# Patient Record
Sex: FEMALE | Race: WHITE | NOT HISPANIC OR LATINO | Employment: FULL TIME | URBAN - METROPOLITAN AREA
[De-identification: names, ages, dates, MRNs, and addresses within clinical notes are randomized per-mention and may not be internally consistent; named-entity substitution may affect disease eponyms.]

---

## 2024-10-25 ENCOUNTER — OFFICE VISIT (OUTPATIENT)
Dept: INTERNAL MEDICINE | Facility: CLINIC | Age: 24
End: 2024-10-25
Payer: COMMERCIAL

## 2024-10-25 VITALS
WEIGHT: 173.6 LBS | DIASTOLIC BLOOD PRESSURE: 68 MMHG | OXYGEN SATURATION: 97 % | BODY MASS INDEX: 27.9 KG/M2 | HEART RATE: 68 BPM | TEMPERATURE: 98.7 F | RESPIRATION RATE: 16 BRPM | HEIGHT: 66 IN | SYSTOLIC BLOOD PRESSURE: 110 MMHG

## 2024-10-25 DIAGNOSIS — Z23 NEED FOR INFLUENZA VACCINATION: ICD-10-CM

## 2024-10-25 DIAGNOSIS — F41.9 ANXIETY AND DEPRESSION: Primary | ICD-10-CM

## 2024-10-25 DIAGNOSIS — F32.A ANXIETY AND DEPRESSION: Primary | ICD-10-CM

## 2024-10-25 RX ORDER — BUPROPION HYDROCHLORIDE 150 MG/1
150 TABLET ORAL DAILY
COMMUNITY
End: 2024-10-25

## 2024-10-25 RX ORDER — BUPROPION HYDROCHLORIDE 300 MG/1
300 TABLET ORAL DAILY
Qty: 30 TABLET | Refills: 1 | Status: SHIPPED | OUTPATIENT
Start: 2024-10-25

## 2024-10-25 NOTE — PROGRESS NOTES
Subjective   Evelyn Lawrence is a 24 y.o. female here to establish care.  Chief Complaint   Patient presents with    Establish Care    Anxiety     Currently taking Wellbutrin but does not like the way it makes her feel, has tried and failed Lexapro in the past     Depression       History of Present Illness  History of Present Illness  The patient is a 24-year-old female who is here to establish care. She has anxiety and depression, is currently on bupropion  mg daily. She is not satisfied with this medication.  She has significant worry about many different things.  She is family stressors.  She is experiencing difficulty focusing, fatigue, sleep disturbance, feeling down, having trouble relaxing.  She denies any HI/SI.  She has tried and failed Lexapro in the past.  Did not like how she felt with it.  She is interested in adjusting medications today.     She is due for flu vaccination like to update that today.  PHQ-9 Depression Screening  Little interest or pleasure in doing things? Several days   Feeling down, depressed, or hopeless? Over half   PHQ-2 Total Score 3   Trouble falling or staying asleep, or sleeping too much? Over half   Feeling tired or having little energy? Over half   Poor appetite or overeating? Almost all   Feeling bad about yourself - or that you are a failure or have let yourself or your family down? Almost all   Trouble concentrating on things, such as reading the newspaper or watching television?  Trouble relaxing, and is starting to affect her life.   Several days   Moving or speaking so slowly that other people could have noticed? Or the opposite - being so fidgety or restless that you have been moving around a lot more than usual? Not at all   Thoughts that you would be better off dead, or of hurting yourself in some way? Not at all   PHQ-9 Total Score 14   If you checked off any problems, how difficult have these problems made it for you to do your work, take care of things at  home, or get along with other people? Not difficult at all     Anxiety CAROLE-7    Feeling nervous, anxious or on edge: More than half the days  Not being able to stop or control worrying: Nearly every day  Worrying too much about different things: Nearly every day  Trouble Relaxing: Nearly every day  Being so restless that it is hard to sit still: Several days  Becoming easily annoyed or irritable: Not at all  Feeling afraid as if something awful might happen: Nearly every day  CAROLE 7 Total Score: 15  If you checked any problems, how difficult have these problems made it for you to do your work, take care of things at home, or get along with other people: Not difficult at all           The following portions of the patient's history were reviewed and updated as appropriate: allergies, current medications, past family history, past medical history, past social history, past surgical history and problem list.          No Known Allergies  Past Medical History:   Diagnosis Date    Anxiety     Depression February 2022    Ovarian cyst     I’ve had them in the past. Last in 2019    Scoliosis     Seizures     Temporal partial     Syncope     Urinary tract infection     I’ve had them in the past    Varicella     I was in elementary school     Past Surgical History:   Procedure Laterality Date    LAPAROSCOPIC SALPINGOOPHERECTOMY Left 08/28/2023    WISDOM TOOTH EXTRACTION       Family History   Problem Relation Age of Onset    Seizures Mother     Other (lupus) Mother     Lupus Mother     Heart attack Father 47    Hyperlipidemia Maternal Grandmother     COPD Paternal Grandfather     Diabetes Paternal Grandfather     Emphysema Paternal Grandfather     Breast cancer Neg Hx     Ovarian cancer Neg Hx     Uterine cancer Neg Hx     Colon cancer Neg Hx      Social History     Socioeconomic History    Marital status:    Tobacco Use    Smoking status: Never    Smokeless tobacco: Never   Vaping Use    Vaping status: Never Used  "  Substance and Sexual Activity    Alcohol use: Not Currently     Comment: I dont drink alot. Maybe once in a couple months    Drug use: No    Sexual activity: Yes     Partners: Male     Birth control/protection: None     Comment: We are trying to get pregant     Immunization History   Administered Date(s) Administered    Covid-19 (Pfizer) Gray Cap Monovalent 03/23/2022, 04/14/2022    Fluzone  >6mos 10/08/2011, 10/25/2024    HPV Quadrivalent 07/27/2009, 07/15/2011, 08/19/2013    Influenza TIV (IM) 09/28/2009, 10/06/2012    Meningococcal MCV4P (Menactra) 08/28/2009    Tdap 07/15/2011       Current Outpatient Medications:     buPROPion XL (Wellbutrin XL) 300 MG 24 hr tablet, Take 1 tablet by mouth Daily., Disp: 30 tablet, Rfl: 1    Objective   Blood pressure 110/68, pulse 68, temperature 98.7 °F (37.1 °C), temperature source Infrared, resp. rate 16, height 168 cm (66.14\"), weight 78.7 kg (173 lb 9.6 oz), SpO2 97%.  Physical Exam         Physical Exam  Vitals and nursing note reviewed.   Constitutional:       General: She is not in acute distress.     Appearance: Normal appearance. She is well-developed. She is not ill-appearing or diaphoretic.   HENT:      Head: Normocephalic and atraumatic.   Eyes:      General: No scleral icterus.     Conjunctiva/sclera: Conjunctivae normal.   Neck:      Thyroid: No thyroid mass or thyromegaly.      Vascular: No JVD.   Cardiovascular:      Rate and Rhythm: Normal rate and regular rhythm.      Chest Wall: PMI is not displaced. No thrill.      Heart sounds: Normal heart sounds. No murmur heard.  Pulmonary:      Effort: Pulmonary effort is normal. No accessory muscle usage or respiratory distress.      Breath sounds: Normal breath sounds.   Chest:      Chest wall: No tenderness.   Abdominal:      General: Bowel sounds are normal. There is no distension.      Palpations: Abdomen is soft.      Tenderness: There is no abdominal tenderness.   Musculoskeletal:         General: Normal range " of motion.      Cervical back: Normal range of motion.      Right lower leg: No edema.      Left lower leg: No edema.   Skin:     General: Skin is warm and dry.      Coloration: Skin is not ashen, jaundiced, mottled or pale.      Findings: No erythema.   Neurological:      General: No focal deficit present.      Mental Status: She is alert and oriented to person, place, and time.   Psychiatric:         Attention and Perception: Attention and perception normal. She is attentive.         Mood and Affect: Mood and affect normal. Mood is not anxious or depressed. Affect is not angry or inappropriate.         Speech: Speech normal.         Behavior: Behavior normal. Behavior is cooperative.         Thought Content: Thought content normal. Thought content is not paranoid or delusional. Thought content does not include homicidal or suicidal ideation. Thought content does not include homicidal or suicidal plan.         Cognition and Memory: Cognition and memory normal.         Judgment: Judgment normal.         Assessment & Plan   Diagnoses and all orders for this visit:    1. Anxiety and depression (Primary)  -     buPROPion XL (Wellbutrin XL) 300 MG 24 hr tablet; Take 1 tablet by mouth Daily.  Dispense: 30 tablet; Refill: 1  -     CBC (No Diff); Future  -     Comprehensive Metabolic Panel; Future  -     Lipid Panel; Future  -     TSH Rfx On Abnormal To Free T4; Future  -     Vitamin B12; Future  -     Vitamin D,25-Hydroxy; Future    2. Need for influenza vaccination  -     Fluzone >6mos ([id number])      Assessment & Plan  1. Anxiety and depression.  Bupropion will be increased to 2 mg daily.  We will follow-up in about 4 weeks to reevaluate.  Will also check labs as above.    2.  Health maintenance  Update flu vaccine today.           Return in about 4 weeks (around 11/22/2024) for Recheck, and needs to return for labs within 1-2 weeks.  Plan of care discussed with pt. They verbalized understanding and agreement.      Patient or patient representative verbalized consent for the use of Ambient Listening during the visit with  TONI Chand for chart documentation. 11/10/2024  13:56 EST

## 2024-10-28 ENCOUNTER — LAB (OUTPATIENT)
Dept: INTERNAL MEDICINE | Facility: CLINIC | Age: 24
End: 2024-10-28
Payer: COMMERCIAL

## 2024-10-28 DIAGNOSIS — F32.A ANXIETY AND DEPRESSION: ICD-10-CM

## 2024-10-28 DIAGNOSIS — F41.9 ANXIETY AND DEPRESSION: ICD-10-CM

## 2024-10-28 PROCEDURE — 80061 LIPID PANEL: CPT | Performed by: NURSE PRACTITIONER

## 2024-10-28 PROCEDURE — 80050 GENERAL HEALTH PANEL: CPT | Performed by: NURSE PRACTITIONER

## 2024-10-28 PROCEDURE — 36415 COLL VENOUS BLD VENIPUNCTURE: CPT | Performed by: NURSE PRACTITIONER

## 2024-10-28 PROCEDURE — 82306 VITAMIN D 25 HYDROXY: CPT | Performed by: NURSE PRACTITIONER

## 2024-10-28 PROCEDURE — 82607 VITAMIN B-12: CPT | Performed by: NURSE PRACTITIONER

## 2024-10-29 LAB
25(OH)D3 SERPL-MCNC: 19.7 NG/ML (ref 30–100)
ALBUMIN SERPL-MCNC: 4.2 G/DL (ref 3.5–5.2)
ALBUMIN/GLOB SERPL: 1.5 G/DL
ALP SERPL-CCNC: 60 U/L (ref 39–117)
ALT SERPL W P-5'-P-CCNC: 18 U/L (ref 1–33)
ANION GAP SERPL CALCULATED.3IONS-SCNC: 8 MMOL/L (ref 5–15)
AST SERPL-CCNC: 21 U/L (ref 1–32)
BILIRUB SERPL-MCNC: 0.5 MG/DL (ref 0–1.2)
BUN SERPL-MCNC: 8 MG/DL (ref 6–20)
BUN/CREAT SERPL: 11.8 (ref 7–25)
CALCIUM SPEC-SCNC: 9.2 MG/DL (ref 8.6–10.5)
CHLORIDE SERPL-SCNC: 105 MMOL/L (ref 98–107)
CHOLEST SERPL-MCNC: 134 MG/DL (ref 0–200)
CO2 SERPL-SCNC: 24 MMOL/L (ref 22–29)
CREAT SERPL-MCNC: 0.68 MG/DL (ref 0.57–1)
DEPRECATED RDW RBC AUTO: 39 FL (ref 37–54)
EGFRCR SERPLBLD CKD-EPI 2021: 124.1 ML/MIN/1.73
ERYTHROCYTE [DISTWIDTH] IN BLOOD BY AUTOMATED COUNT: 11.1 % (ref 12.3–15.4)
GLOBULIN UR ELPH-MCNC: 2.8 GM/DL
GLUCOSE SERPL-MCNC: 69 MG/DL (ref 65–99)
HCT VFR BLD AUTO: 40 % (ref 34–46.6)
HDLC SERPL-MCNC: 43 MG/DL (ref 40–60)
HGB BLD-MCNC: 13.5 G/DL (ref 12–15.9)
LDLC SERPL CALC-MCNC: 75 MG/DL (ref 0–100)
LDLC/HDLC SERPL: 1.72 {RATIO}
MCH RBC QN AUTO: 32.7 PG (ref 26.6–33)
MCHC RBC AUTO-ENTMCNC: 33.8 G/DL (ref 31.5–35.7)
MCV RBC AUTO: 96.9 FL (ref 79–97)
PLATELET # BLD AUTO: 268 10*3/MM3 (ref 140–450)
PMV BLD AUTO: 11.8 FL (ref 6–12)
POTASSIUM SERPL-SCNC: 4.1 MMOL/L (ref 3.5–5.2)
PROT SERPL-MCNC: 7 G/DL (ref 6–8.5)
RBC # BLD AUTO: 4.13 10*6/MM3 (ref 3.77–5.28)
SODIUM SERPL-SCNC: 137 MMOL/L (ref 136–145)
TRIGL SERPL-MCNC: 85 MG/DL (ref 0–150)
TSH SERPL DL<=0.05 MIU/L-ACNC: 1.18 UIU/ML (ref 0.27–4.2)
VIT B12 BLD-MCNC: 321 PG/ML (ref 211–946)
VLDLC SERPL-MCNC: 16 MG/DL (ref 5–40)
WBC NRBC COR # BLD AUTO: 7.27 10*3/MM3 (ref 3.4–10.8)

## 2024-11-01 ENCOUNTER — PATIENT ROUNDING (BHMG ONLY) (OUTPATIENT)
Dept: INTERNAL MEDICINE | Facility: CLINIC | Age: 24
End: 2024-11-01

## 2024-11-14 ENCOUNTER — OFFICE VISIT (OUTPATIENT)
Dept: OBSTETRICS AND GYNECOLOGY | Facility: CLINIC | Age: 24
End: 2024-11-14
Payer: COMMERCIAL

## 2024-11-14 VITALS — DIASTOLIC BLOOD PRESSURE: 74 MMHG | SYSTOLIC BLOOD PRESSURE: 112 MMHG

## 2024-11-14 DIAGNOSIS — Z31.9 INFERTILITY MANAGEMENT: Primary | ICD-10-CM

## 2024-11-14 RX ORDER — DOXYCYCLINE 100 MG/1
100 CAPSULE ORAL 2 TIMES DAILY
Qty: 28 CAPSULE | Refills: 0 | Status: SHIPPED | OUTPATIENT
Start: 2024-11-14 | End: 2024-11-28

## 2024-11-14 RX ORDER — LETROZOLE 2.5 MG/1
TABLET, FILM COATED ORAL
Qty: 5 TABLET | Refills: 0 | Status: SHIPPED | OUTPATIENT
Start: 2024-11-14

## 2024-11-14 NOTE — PROGRESS NOTES
Chief Complaint   Patient presents with    Follow-up    Infertility       Subjective   HPI  Evelyn Lawrence is a 24 y.o. female, , LMP was on Patient's last menstrual period was 2024 (exact date). who presents for preconceptual counseling.    Patient reports she has irregular periods. She started a period Monday but before that her last period was . She reports she had an ectopic pregnancy last year and after surgery she started to have regular periods but now are irregular again. Her periods are lasting  5-6  days.  Dysmenorrhea:moderate, occurring first 1-2 days of flow. The patient reports additional symptoms as  fatigue and insomnia .  She is currently trying to conceive. She has intercourse approximately 3 times per week. Her past medical history is noted for: anxiety/depression. and previous pelvic surgery. She has not had a previous workup for infertility. Partner Status: Marital Status: . Her partner has not fathered children. His past medical history is notable for no medical issues..    She is checking ovulation tests at home and she is ovulating monthly. He has had a semen analysis done and it was normal.     Current Outpatient Medications on File Prior to Visit   Medication Sig Dispense Refill    buPROPion XL (Wellbutrin XL) 300 MG 24 hr tablet Take 1 tablet by mouth Daily. 30 tablet 1     No current facility-administered medications on file prior to visit.        Additional OB/GYN History   Last Pap :   Last Completed Pap Smear            PAP SMEAR (Every 3 Years) Next due on 2022  LIQUID-BASED PAP SMEAR, [facility] ([facility])                  History of abnormal Pap smear: no  Exercises Regularly: yes  Feelings of Anxiety or Depression: yes. On medication  Tobacco Usage?: No   OB History          1    Para   0    Term   0       0    AB   1    Living   0         SAB   0    IAB   0    Ectopic   1    Molar   0    Multiple    0    Live Births   0                  Current Outpatient Medications:     buPROPion XL (Wellbutrin XL) 300 MG 24 hr tablet, Take 1 tablet by mouth Daily., Disp: 30 tablet, Rfl: 1    doxycycline (MONODOX) 100 MG capsule, Take 1 capsule by mouth 2 (Two) Times a Day for 14 days., Disp: 28 capsule, Rfl: 0    letrozole (Femara) 2.5 MG tablet, 1 po day 3 thru 7 of cycle, Disp: 5 tablet, Rfl: 0     Past Medical History:   Diagnosis Date    Anxiety     Depression February 2022    Ectopic pregnancy 08/28/2023    Ovarian cyst     I’ve had them in the past. Last in 2019    Scoliosis     Seizures     Temporal partial     Syncope     Urinary tract infection     I’ve had them in the past    Varicella     I was in elementary school        Past Surgical History:   Procedure Laterality Date    LAPAROSCOPIC SALPINGOOPHERECTOMY Left 08/28/2023    WISDOM TOOTH EXTRACTION         The additional following portions of the patient's history were reviewed and updated as appropriate: allergies, current medications, past family history, past medical history, past social history, and past surgical history.    Review of Systems  All other systems reviewed and are negative.     I have reviewed and agree with the HPI, ROS, and historical information as entered above. Sushant Negro MD    Labs were nl at [facility]   Objective   /74   LMP 11/11/2024 (Exact Date)     Physical Examnot done     Assessment & Plan     Assessment and Plan    Problem List Items Addressed This Visit    None  Visit Diagnoses       Infertility management    -  Primary    Relevant Medications    doxycycline (MONODOX) 100 MG capsule    letrozole (Femara) 2.5 MG tablet        Will try Femara starting today day 4 of cycle     Clomid prescribed.  Patient advised on risks and benefits as well as how to use.    No follow-ups on file.      Sushant Negro MD  11/14/2024

## 2024-11-26 ENCOUNTER — OFFICE VISIT (OUTPATIENT)
Dept: INTERNAL MEDICINE | Facility: CLINIC | Age: 24
End: 2024-11-26
Payer: COMMERCIAL

## 2024-11-26 VITALS
TEMPERATURE: 97.8 F | HEIGHT: 66 IN | DIASTOLIC BLOOD PRESSURE: 68 MMHG | HEART RATE: 72 BPM | OXYGEN SATURATION: 98 % | WEIGHT: 173 LBS | BODY MASS INDEX: 27.8 KG/M2 | SYSTOLIC BLOOD PRESSURE: 102 MMHG | RESPIRATION RATE: 16 BRPM

## 2024-11-26 DIAGNOSIS — F32.A ANXIETY AND DEPRESSION: Primary | ICD-10-CM

## 2024-11-26 DIAGNOSIS — F41.9 ANXIETY AND DEPRESSION: Primary | ICD-10-CM

## 2024-11-26 DIAGNOSIS — Z23 NEED FOR DIPHTHERIA-TETANUS-PERTUSSIS (TDAP) VACCINE: ICD-10-CM

## 2024-11-26 PROCEDURE — 90471 IMMUNIZATION ADMIN: CPT | Performed by: NURSE PRACTITIONER

## 2024-11-26 PROCEDURE — 90715 TDAP VACCINE 7 YRS/> IM: CPT | Performed by: NURSE PRACTITIONER

## 2024-11-26 PROCEDURE — 99214 OFFICE O/P EST MOD 30 MIN: CPT | Performed by: NURSE PRACTITIONER

## 2024-11-26 RX ORDER — BUPROPION HYDROCHLORIDE 300 MG/1
300 TABLET ORAL DAILY
Qty: 90 TABLET | Refills: 1 | Status: SHIPPED | OUTPATIENT
Start: 2024-11-26

## 2024-11-26 RX ORDER — SERTRALINE HYDROCHLORIDE 25 MG/1
25 TABLET, FILM COATED ORAL DAILY
Qty: 30 TABLET | Refills: 1 | Status: SHIPPED | OUTPATIENT
Start: 2024-11-26

## 2024-11-26 NOTE — LETTER
[facility]  Vaccine Consent Form    Patient Name:  Evelyn Lawrence  Patient :  2000     Vaccine(s) Ordered    Tdap Vaccine Greater Than or Equal To 8yo IM        Screening Checklist  The following questions should be completed prior to vaccination. If you answer “yes” to any question, it does not necessarily mean you should not be vaccinated. It just means we may need to clarify or ask more questions. If a question is unclear, please ask your healthcare provider to explain it.    Yes No   Any fever or moderate to severe illness today (mild illness and/or antibiotic treatment are not contraindications)?     Do you have a history of a serious reaction to any previous vaccinations, such as anaphylaxis, encephalopathy within 7 days, Guillain-Mamou syndrome within 6 weeks, seizure?     Have you received any live vaccine(s) (e.g MMR, MARK) or any other vaccines in the last month (to ensure duplicate doses aren't given)?     Do you have an anaphylactic allergy to latex (DTaP, DTaP-IPV, Hep A, Hep B, MenB, RV, Td, Tdap), baker’s yeast (Hep B, HPV), polysorbates (RSV, nirsevimab, PCV 20, Rotavirrus, Tdap, Shingrix), or gelatin (MARK, MMR)?     Do you have an anaphylactic allergy to neomycin (Rabies, MARK, MMR, IPV, Hep A), polymyxin B (IPV), or streptomycin (IPV)?      Any cancer, leukemia, AIDS, or other immune system disorder? (MARK, MMR, RV)     Do you have a parent, brother, or sister with an immune system problem (if immune competence of vaccine recipient clinically verified, can proceed)? (MMR, MARK)     Any recent steroid treatments for >2 weeks, chemotherapy, or radiation treatment? (MARK, MMR)     Have you received antibody-containing blood transfusions or IVIG in the past 11 months (recommended interval is dependent on product)? (MMR, MARK)     Have you taken antiviral drugs (acyclovir, famciclovir, valacyclovir for MARK) in the last 24 or 48 hours, respectively?      Are you pregnant or planning to become  "pregnant within 1 month? (MARK, MMR, HPV, IPV, MenB, Abrexvy; For Hep B- refer to Engerix-B; For RSV - Abrysvo is indicated for 32-36 weeks of pregnancy from September to January)     For infants, have you ever been told your child has had intussusception or a medical emergency involving obstruction of the intestine (Rotavirus)? If not for an infant, can skip this question.         *Ordering Physicians/APC should be consulted if \"yes\" is checked by the patient or guardian above.  I have received, read, and understand the Vaccine Information Statement (VIS) for each vaccine ordered.  I have considered my or my child's health status as well as the health status of my close contacts.  I have taken the opportunity to discuss my vaccine questions with my or my child's health care provider.   I have requested that the ordered vaccine(s) be given to me or my child.  I understand the benefits and risks of the vaccines.  I understand that I should remain in the clinic for 15 minutes after receiving the vaccine(s).  _________________________________________________________  Signature of Patient or Parent/Legal Guardian ____________________  Date     "

## 2024-11-27 NOTE — PROGRESS NOTES
Evelyn Lawrence presents to [facility] for     Chief Complaint  Chief Complaint   Patient presents with    Anxiety    Depression       PCP:  Zee Ervin APRN    Subjective        Pertinent negative symptoms include no abdominal pain, no anorexia, no joint pain, no change in stool, no chest pain, no chills, no congestion, no cough, no diaphoresis, no fatigue, no fever, no headaches, no joint swelling, no myalgias, no nausea, no neck pain, no numbness, no rash, no sore throat, no swollen glands, no dysuria, no vertigo, no visual change, no vomiting and no weakness.   Treatment and/or Medications comments include: I dont have any of these.   Additional information:  Its just a check up       Evelyn reports that the Wellbutrin has made some improvement but she does not note that increasing provide 150 to 300 mg has necessary done much for her anxiety or depression.  She tried Lexapro a long time ago and does not recall any issues with it.  However she recalls being on a lower dose and not being on it for very long.  Anxiety CAROLE-7    Feeling nervous, anxious or on edge: More than half the days  Not being able to stop or control worrying: Nearly every day  Worrying too much about different things: Nearly every day  Trouble Relaxing: More than half the days  Being so restless that it is hard to sit still: Not at all  Becoming easily annoyed or irritable: More than half the days  Feeling afraid as if something awful might happen: Nearly every day  CAROLE 7 Total Score: 15  If you checked any problems, how difficult have these problems made it for you to do your work, take care of things at home, or get along with other people: Somewhat difficult     PHQ-9 Depression Screening  Little interest or pleasure in doing things? Over half   Feeling down, depressed, or hopeless? Over half   PHQ-2 Total Score 4   Trouble falling or staying asleep, or sleeping too much? Several days   Feeling  tired or having little energy? Several days   Poor appetite or overeating? Almost all   Feeling bad about yourself - or that you are a failure or have let yourself or your family down? Almost all   Trouble concentrating on things, such as reading the newspaper or watching television? Not at all   Moving or speaking so slowly that other people could have noticed? Or the opposite - being so fidgety or restless that you have been moving around a lot more than usual? Not at all   Thoughts that you would be better off dead, or of hurting yourself in some way? Not at all   PHQ-9 Total Score 12   If you checked off any problems, how difficult have these problems made it for you to do your work, take care of things at home, or get along with other people? Somewhat difficult       Health Maintenance:   Health Maintenance   Topic Date Due    HEPATITIS C SCREENING  Never done    ANNUAL PHYSICAL  Never done    COVID-19 Vaccine (3 - 2024-25 season) 11/28/2024 (Originally 9/1/2024)    PAP SMEAR  09/16/2025    BMI FOLLOWUP  10/25/2025    TDAP/TD VACCINES (3 - Td or Tdap) 11/26/2034    INFLUENZA VACCINE  Completed    HPV VACCINES  Completed    Pneumococcal Vaccine 0-64  Aged Out             Review of Systems   Constitutional:  Negative for chills, diaphoresis, fatigue and fever.   HENT:  Negative for congestion, sore throat and swollen glands.    Respiratory:  Negative for cough.    Cardiovascular:  Negative for chest pain.   Gastrointestinal:  Negative for abdominal pain, anorexia, nausea and vomiting.   Genitourinary:  Negative for dysuria.   Musculoskeletal:  Negative for joint pain, myalgias and neck pain.   Skin:  Negative for rash.   Neurological:  Negative for vertigo, weakness and numbness.         No Known Allergies  No current outpatient medications on file prior to visit.     No current facility-administered medications on file prior to visit.         The following portions of the patient's history were reviewed and  "updated as appropriate: allergies, current medications, past family history, past medical history, past social history, past surgical history and problem list and are available for review within electronic record    Objective         Vital Signs:   /68 (BP Location: Left arm, Patient Position: Sitting, Cuff Size: Adult)   Pulse 72   Temp 97.8 °F (36.6 °C) (Infrared)   Resp 16   Ht 168 cm (66.14\")   Wt 78.5 kg (173 lb)   SpO2 98%   BMI 27.80 kg/m²         Physical Exam  Constitutional:       Appearance: Normal appearance. She is well-developed.   HENT:      Head: Normocephalic and atraumatic.   Eyes:      General: No scleral icterus.     Conjunctiva/sclera: Conjunctivae normal.   Cardiovascular:      Rate and Rhythm: Normal rate.   Pulmonary:      Effort: Pulmonary effort is normal. No respiratory distress.   Abdominal:      Palpations: Abdomen is soft.      Tenderness: There is no abdominal tenderness.   Musculoskeletal:         General: Normal range of motion.      Cervical back: Normal range of motion.      Right lower leg: No edema.      Left lower leg: No edema.   Skin:     General: Skin is warm and dry.   Neurological:      General: No focal deficit present.      Mental Status: She is alert and oriented to person, place, and time.   Psychiatric:         Attention and Perception: Attention normal.         Mood and Affect: Mood and affect normal. Mood is not anxious or depressed.         Behavior: Behavior normal. Behavior is cooperative.         Thought Content: Thought content normal. Thought content is not paranoid. Thought content does not include homicidal or suicidal ideation.         Cognition and Memory: Cognition normal.         Judgment: Judgment normal.                 Assessment and Plan      Diagnoses and all orders for this visit:    1. Anxiety and depression (Primary)  -     sertraline (Zoloft) 25 MG tablet; Take 1 tablet by mouth Daily.  Dispense: 30 tablet; Refill: 1  -     buPROPion " XL (Wellbutrin XL) 300 MG 24 hr tablet; Take 1 tablet by mouth Daily.  Dispense: 90 tablet; Refill: 1    2. Need for diphtheria-tetanus-pertussis (Tdap) vaccine  -     Tdap Vaccine Greater Than or Equal To 6yo IM    She is due for Tdap vaccination.  Will update that today.  Anxiety and depression are still uncontrolled albeit improving.  We will continue her bupropion at current dose and add sertraline 25 mg.  Adverse effects and expectations have been discussed.  She is interested in pregnancy in the future so this will be considered for medication dosing choices.  We may consider decreasing the bupropion soon since she did not notice much difference between 150 to 300 mg doses.  She will follow-up in about 4 to 6 weeks to reevaluate and keep me informed of any concerns in the interim.    Follow Up     Patient was given instructions and counseling regarding her condition or for health maintenance advice. Please see specific information pulled into the AVS if appropriate.   Any new medication's adverse effects have been discussed in detail with patient  Patient was encouraged to keep me informed of any acute changes, lack of improvement, or any new concerning symptoms.    Return in about 6 weeks (around 1/7/2025).          Dictated Utilizing Dragon Dictation   Please note that portions of this note were completed with a voice recognition program.   Part of this note may be an electronic transcription/translation of spoken language to printed text using the Dragon Dictation System.

## 2024-12-16 ENCOUNTER — TELEPHONE (OUTPATIENT)
Dept: OBSTETRICS AND GYNECOLOGY | Facility: CLINIC | Age: 24
End: 2024-12-16
Payer: COMMERCIAL

## 2024-12-16 DIAGNOSIS — Z31.9 INFERTILITY MANAGEMENT: ICD-10-CM

## 2024-12-16 DIAGNOSIS — Z31.9 INFERTILITY MANAGEMENT: Primary | ICD-10-CM

## 2024-12-16 RX ORDER — LETROZOLE 2.5 MG/1
5 TABLET, FILM COATED ORAL DAILY
Qty: 10 TABLET | Refills: 0 | Status: SHIPPED | OUTPATIENT
Start: 2024-12-16 | End: 2024-12-16 | Stop reason: SDUPTHER

## 2024-12-16 RX ORDER — LETROZOLE 2.5 MG/1
5 TABLET, FILM COATED ORAL DAILY
Qty: 10 TABLET | Refills: 0 | Status: SHIPPED | OUTPATIENT
Start: 2024-12-16 | End: 2024-12-21

## 2024-12-16 NOTE — TELEPHONE ENCOUNTER
Caller: Melinda Evelyn Tianna    Relationship: Self    Best call back number:    [phone]    Requested Prescriptions:     LETROZOL (INCREASE DOSAGE PER LAST OFFICE VISIT)     Pharmacy where request should [facility] sent[facility] [id number] [place] [facility] [address] [address] & [facility] - [phone] [facility] [phone]      Last office visit with prescribing clinician: 11/14/2024   Last telemedicine visit with prescribing clinician: Visit date not found   Next office visit with prescribing clinician: Visit date not found     Additional details provided by patient:     PT IS NOT PREGNANT AND NEEDS SCRIPT SENT IN        Does the patient have less than a 3 day supply:  [x] Yes  [] No    Would you like a call back once the refill request has been completed: [x] Yes [] No    If the office needs to give you a call back, can they leave a voicemail: [x] Yes [] No    Anton Contreras Rep   12/16/24 12:05 EST

## 2024-12-16 NOTE — TELEPHONE ENCOUNTER
Caller: Evelyn Lawrence    Relationship: Self    Best call back number: [phone]    What was the call regarding: PT REQUESTING TO HAVE THE letrozole (Femara) 2.5 MG tablet PRESCRIPTION SENT TO:   [facility]   Address: [facility] [place] [address]  Phone: [phone]     STATED THIS PHARMACY IS MU[place] CHEAPER THAN [place]

## 2025-01-09 ENCOUNTER — TELEPHONE (OUTPATIENT)
Dept: OBSTETRICS AND GYNECOLOGY | Facility: CLINIC | Age: 25
End: 2025-01-09
Payer: COMMERCIAL

## 2025-01-09 NOTE — TELEPHONE ENCOUNTER
Patient of Dr. Negro; LOV 11/14/24. She was given Rx for Femara 2.5 mg.  She was given another Rx for Femara 5 mg daily on 12/16/24.  Returned patient's call.   States she took the Femara in November. She did not take the Femara in December because her period was much lighter than usual and only lasted 2 days; it started 12/16/24.   States she had negative UPT December 30, 31st, January 1, and 8.   Advised that she can use Femara with her next cycle. She should call if she does not start her next period and still has a negative pregnancy test.   She v/u and agreed.

## 2025-01-09 NOTE — TELEPHONE ENCOUNTER
Provider: DR. BARBOSA    Caller: Evelyn Lawrence    Relationship to Patient: Self    Pharmacy:     Phone Number: [phone]    Reason for Call: MEDICAL QUESTION     When was the patient last seen: 11.14.24    PATIENT IS CALLING IN TO LET DR. BARBOSA KNOW THAT SHE HAS STOPPED TALKING THE MEDICATION  letrozole (Femara) 2.5 MG tablet  BECAUSE HER CYCLE HAS BEEN IRREGULAR. PATIENT HAS NOT TAKING MEDICATION SINCE 11.13.2024 BECAUSE SHE ONLY HAD A CYCLE FOR 2 DAYS IN THE LAST MONTH THAT WAS VERY LIGHT.    PATIENT CAN BE REACHED .[phone]    THANK YOU

## 2025-01-13 NOTE — TELEPHONE ENCOUNTER
Caller: Evelyn Lawrence     Relationship: SELF    Best call back number: [phone]      What is your medical concern? PT CALLED TO INFORM PROVIDER THAT SHE STARTED HER CYCLE ON THE 10TH AND IS TAKING THE  letrozole (Femara) 2.5 MG tablet  AGAIN SINCE SHE STARTED HER CYCLE    Is your provider already aware of this issue? YES

## 2025-01-14 ENCOUNTER — OFFICE VISIT (OUTPATIENT)
Dept: INTERNAL MEDICINE | Facility: CLINIC | Age: 25
End: 2025-01-14
Payer: COMMERCIAL

## 2025-01-14 VITALS
HEIGHT: 66 IN | BODY MASS INDEX: 27.87 KG/M2 | TEMPERATURE: 97.8 F | WEIGHT: 173.4 LBS | SYSTOLIC BLOOD PRESSURE: 100 MMHG | RESPIRATION RATE: 14 BRPM | DIASTOLIC BLOOD PRESSURE: 64 MMHG | HEART RATE: 68 BPM | OXYGEN SATURATION: 96 %

## 2025-01-14 DIAGNOSIS — F41.9 ANXIETY AND DEPRESSION: ICD-10-CM

## 2025-01-14 DIAGNOSIS — F32.A ANXIETY AND DEPRESSION: ICD-10-CM

## 2025-01-14 PROCEDURE — 99213 OFFICE O/P EST LOW 20 MIN: CPT | Performed by: NURSE PRACTITIONER

## 2025-01-14 NOTE — PROGRESS NOTES
Subjective   Evelyn Lawrence is a 24 y.o. female.     Chief Complaint   Patient presents with    Anxiety    Depression     6 week f/u        PCP: Zee Ervin APRN    History of Present Illness     History of Present Illness    Evelyn is a 24-year-old female who is here today to follow-up on anxiety and depression.  She reports that she feels as though she is doing overall fairly well.  She is currently on sertraline.  Tolerating well without any side effects.  She is also on bupropion 300 mg daily.  She does note that both medications have significantly helped.  Although, she feels as though she could have a little bit better control of anxiety.  No HI/SI.  Anxiety CAROLE-7    Feeling nervous, anxious or on edge: More than half the days  Not being able to stop or control worrying: Nearly every day  Worrying too much about different things: Nearly every day  Trouble Relaxing: Not at all  Being so restless that it is hard to sit still: Not at all  Becoming easily annoyed or irritable: Several days  Feeling afraid as if something awful might happen: More than half the days  CAROLE 7 Total Score: 11  If you checked any problems, how difficult have these problems made it for you to do your work, take care of things at home, or get along with other people: Not difficult at all     PHQ-9 Depression Screening  Little interest or pleasure in doing things? Several days   Feeling down, depressed, or hopeless? Several days   PHQ-2 Total Score 2   Trouble falling or staying asleep, or sleeping too much? Not at all   Feeling tired or having little energy? Several days   Poor appetite or overeating? Over half   Feeling bad about yourself - or that you are a failure or have let yourself or your family down? Over half   Trouble concentrating on things, such as reading the newspaper or watching television? Not at all   Moving or speaking so slowly that other people could have noticed? Or the opposite - being so fidgety or restless  "that you have been moving around a lot more than usual? Not at all   Thoughts that you would be better off dead, or of hurting yourself in some way? Not at all   PHQ-9 Total Score 7   If you checked off any problems, how difficult have these problems made it for you to do your work, take care of things at home, or get along with other people? Not difficult at all       Results      Lab Results   Component Value Date    WBC 7.27 10/28/2024    HGB 13.5 10/28/2024    HCT 40.0 10/28/2024    MCV 96.9 10/28/2024     10/28/2024     Lab Results   Component Value Date    GLUCOSE 69 10/28/2024    BUN 8 10/28/2024    CREATININE 0.68 10/28/2024    EGFR 124.1 10/28/2024    BCR 11.8 10/28/2024    K 4.1 10/28/2024    CO2 24.0 10/28/2024    CALCIUM 9.2 10/28/2024    ALBUMIN 4.2 10/28/2024    BILITOT 0.5 10/28/2024    AST 21 10/28/2024    ALT 18 10/28/2024     Lab Results   Component Value Date    CHOL 134 10/28/2024    TRIG 85 10/28/2024    HDL 43 10/28/2024    LDL 75 10/28/2024     Lab Results   Component Value Date    TSH 1.180 10/28/2024         The following portions of the patient's history were reviewed and updated as appropriate: allergies, current medications, past family history, past medical history, past social history, past surgical history and problem list.              Outpatient Medications Marked as Taking for the 1/14/25 encounter (Office Visit) with Zee Ervin APRN   Medication Sig Dispense Refill    sertraline (Zoloft) 50 MG tablet Take 1 tablet by mouth Daily. 90 tablet 1     No Known Allergies        Objective     Vitals:    01/14/25 1052   BP: 100/64   BP Location: Left arm   Patient Position: Sitting   Cuff Size: Adult   Pulse: 68   Resp: 14   Temp: 97.8 °F (36.6 °C)   TempSrc: Infrared   SpO2: 96%   Weight: 78.7 kg (173 lb 6.4 oz)   Height: 168 cm (66.14\")     Body mass index is 27.87 kg/m².  Wt Readings from Last 3 Encounters:   01/14/25 78.7 kg (173 lb 6.4 oz)   11/26/24 78.5 kg (173 lb) "   10/25/24 78.7 kg (173 lb 9.6 oz)       Physical Exam  Constitutional:       Appearance: Normal appearance. She is well-developed.   HENT:      Head: Normocephalic and atraumatic.   Eyes:      General: No scleral icterus.     Conjunctiva/sclera: Conjunctivae normal.   Cardiovascular:      Rate and Rhythm: Normal rate and regular rhythm.      Heart sounds: Normal heart sounds.   Pulmonary:      Effort: Pulmonary effort is normal. No respiratory distress.      Breath sounds: Normal breath sounds.   Abdominal:      General: Bowel sounds are normal.      Palpations: Abdomen is soft.      Tenderness: There is no abdominal tenderness.   Musculoskeletal:         General: Normal range of motion.      Cervical back: Normal range of motion.      Right lower leg: No edema.      Left lower leg: No edema.   Skin:     General: Skin is warm and dry.   Neurological:      General: No focal deficit present.      Mental Status: She is alert and oriented to person, place, and time.   Psychiatric:         Attention and Perception: Attention normal.         Mood and Affect: Mood and affect normal.         Behavior: Behavior normal. Behavior is cooperative.         Thought Content: Thought content normal.         Cognition and Memory: Cognition and memory normal.         Judgment: Judgment normal.                 Assessment & Plan   Diagnoses and all orders for this visit:    1. Anxiety and depression  -     sertraline (Zoloft) 50 MG tablet; Take 1 tablet by mouth Daily.  Dispense: 90 tablet; Refill: 1      She has had improvement in symptoms with the sertraline and the bupropion.  She would like to try to augment medication therapy.  We will go ahead and increase her sertraline to 50 mg while continuing her bupropion at 300 mg.  She will follow-up in about 6-8weeks to reevaluate.  She will keep me informed of any problems or concerns      Return in about 2 months (around 3/14/2025) for Recheck.    I discussed my  findings,recommendations, and plan of care was with the patient. They verbalized understanding and agreement.  Patient was encouraged to keep me informed of any acute changes, lack of improvement, or any new concerning symptoms.

## 2025-01-16 ENCOUNTER — TELEPHONE (OUTPATIENT)
Dept: OBSTETRICS AND GYNECOLOGY | Facility: CLINIC | Age: 25
End: 2025-01-16
Payer: COMMERCIAL

## 2025-01-16 NOTE — TELEPHONE ENCOUNTER
Patient of Dr. Negro; LOV 11/14/24.  She is trying to conceive and has been given Femara.  Attempted to return patient's call. Left voice message to call us back.

## 2025-01-16 NOTE — TELEPHONE ENCOUNTER
Patient returned my call.  States she read on [employer] that she might need to have a Cycle Day 21 Progesterone level while using Femara to try to conceive.  Discussed that Dr. Negro's last note states she was using OPKs that indicate she is ovulating. He did not indicate a need for labs.  She v/u.

## 2025-01-16 NOTE — TELEPHONE ENCOUNTER
Caller: Evelyn Lawrence     Relationship: SELF     Best call back number: [phone]    What is your medical concern? PT IS TAKING MEDICATION FOR OVULATION, SHE WANTS TO KNOW IF SHE NEEDS TO COME IN ON A MONTHLY BASIS AFTER HER 21ST DAY IN CYCLE TO HAVE HER LEVELS CHECKED OR ???    How long has this issue been going on? ONGOING    Is your provider already aware of this issue? YES    Have you been treated for this issue? JUST WANT TO FOLLOW UP TO MAKE SURE SHE IS DOING THIS CORRECTLY    PLEASE CALL TO ADVISE

## 2025-02-06 ENCOUNTER — TELEPHONE (OUTPATIENT)
Dept: OBSTETRICS AND GYNECOLOGY | Facility: CLINIC | Age: 25
End: 2025-02-06
Payer: COMMERCIAL

## 2025-02-06 DIAGNOSIS — Z31.9 INFERTILITY MANAGEMENT: Primary | ICD-10-CM

## 2025-02-06 NOTE — TELEPHONE ENCOUNTER
"  Caller: Evelyn Lawrence \"Evelyn\"  Female, 24 y.o., 2000  MRN: [id number]  CSN: [id number]  Phone: [phone]    Relationship: SELF        Requested Prescriptions: letrozole (Femara) 2.5 MG tablet   Requested Prescriptions      No prescriptions requested or ordered in this encounter        Pharmacy where request should be sent:    [address] - [address] [place] [address] [phone] [facility] [phone]    [address] [place] [id number]   Phone: [phone] Fax: [phone]   Hours: Not open 24 hours       Last office visit with prescribing clinician: 11/14/2024   Last telemedicine visit with prescribing clinician: Visit date not found   Next office visit with prescribing clinician: Visit date not found     Additional details provided by patient: PT STATES SHE BEGAN HER CYCLE THIS MORNING, PT STATES SHE DID OVULATE THIS MONTH AS WELL      "

## 2025-02-06 NOTE — TELEPHONE ENCOUNTER
Patient of Dr. Negro; LOV 11/14/24 for infertility management.   11/14/24  Rx for Femara 2.5 mg daily  12/16/24  Rx for Femara 5 mg daily  Returned patient's call.   She started her period this morning and is requesting another RX for Femara.   Informed her that Dr. Negro is out of the office this afternoon. I have spoken with one of his nurses, Nia. She will check with him tomorrow and call patient back.  Patient v/u and agreed.

## 2025-02-07 RX ORDER — LETROZOLE 2.5 MG/1
TABLET, FILM COATED ORAL
Qty: 15 TABLET | Refills: 0 | Status: SHIPPED | OUTPATIENT
Start: 2025-02-07

## 2025-02-07 RX ORDER — LETROZOLE 2.5 MG/1
TABLET, FILM COATED ORAL
Qty: 15 TABLET | Refills: 0 | Status: SHIPPED | OUTPATIENT
Start: 2025-02-07 | End: 2025-02-07

## 2025-02-07 NOTE — TELEPHONE ENCOUNTER
PT WENT TO  PRESCRIPTION AND IT IS OVER A HUNDRED DOLLARS - PT WOULD LIKE TO KNOW IF WE CAN SEND IT BACK TO [place] IN [place] - PLEASE CALL

## 2025-03-04 ENCOUNTER — TELEPHONE (OUTPATIENT)
Dept: OBSTETRICS AND GYNECOLOGY | Facility: CLINIC | Age: 25
End: 2025-03-04
Payer: COMMERCIAL

## 2025-03-04 DIAGNOSIS — O09.10 PREGNANCY WITH HISTORY OF ECTOPIC PREGNANCY, ANTEPARTUM: Primary | ICD-10-CM

## 2025-03-04 NOTE — TELEPHONE ENCOUNTER
PT STATES SHE JUST FOUND OUT SHE WAS PREGNANT, LMP WAS 2/9, SHE STATES HER LAST PREGNANCY WAS AN ECTOPIC PREGNANCY SO SHE WANTED TO SPEAK TO SOMEONE AND SEE WHAT SHE NEEDED TO DO    PLEASE ADVISE

## 2025-03-04 NOTE — TELEPHONE ENCOUNTER
Patient of Dr. Negro; LOV 11/14/24.  Returned patient's call.   LMP 02/09/25 = 3w 2d; used Femara to conceive  +UPT x 7 today  States she tested positive for Flu A 2 days ago. She was having some nausea and was given Rx for Zofran. Advised not to use Zofran or any Ibuprofen products.   States she is feeling better today.   Denies any bleeding, pain, or problems.  Discussed that she can try plain Mucinex, plain Robitussin, cough drops, Zyrtec or Claritin, or Flonase nasal spray. Avoid decongestants.    She has hx ectopic pregnancy; asking if she needs to do anything.   Discussed that we can wait until she recovers from the flu and has missed her period. She can come in next Monday (3/10/25) for HCG and Progesterone levels. May need to repeat after 48-72 hours.   Patient v/u and agreed. She will call sooner with any problems.

## 2025-03-04 NOTE — TELEPHONE ENCOUNTER
Caller: Evelyn Lawrence    Relationship to patient: Self    Best call back number: [phone] (home)       Patient FORGOT TO MENTION THAT SHE ALSO TESTED POSITIVE FOR FLU A OVER THE WEEKEND TOO.

## 2025-03-07 ENCOUNTER — LAB (OUTPATIENT)
Dept: INTERNAL MEDICINE | Facility: CLINIC | Age: 25
End: 2025-03-07
Payer: COMMERCIAL

## 2025-03-07 ENCOUNTER — TELEPHONE (OUTPATIENT)
Dept: OBSTETRICS AND GYNECOLOGY | Facility: CLINIC | Age: 25
End: 2025-03-07
Payer: COMMERCIAL

## 2025-03-07 PROCEDURE — 84144 ASSAY OF PROGESTERONE: CPT | Performed by: OBSTETRICS & GYNECOLOGY

## 2025-03-07 PROCEDURE — 84702 CHORIONIC GONADOTROPIN TEST: CPT | Performed by: OBSTETRICS & GYNECOLOGY

## 2025-03-07 NOTE — TELEPHONE ENCOUNTER
Patient states she is early pregnant about 3 weeks and is having some tingling in her previous laparoscopic ectopic surgery 2023. She states it is the R side. She also reports mild cramping on the same side. She denies any redness, warmth to touch, or pain at the scar site. I do see that an HCG and progesterone was ordered but she has not had that completed yet. I let her know I will speak with OP and call her back with recommendations. She VU

## 2025-03-07 NOTE — TELEPHONE ENCOUNTER
Provider: DR BARBOSA     Caller: Evelyn Lawrence    Relationship to Patient: Self        Reason for Call: PT HAS HAD A WEIRD FEELING IN HER ECTOPIC SURGERY SCAR (ITS HARD FOR PT TO EXPLAIN) KIND OF TINGLY NOT PAINFUL AND WOULD LIKE TO BE ADVISED AS IF ITS NORMAL  PLEASE CALL

## 2025-03-07 NOTE — TELEPHONE ENCOUNTER
Spoke with OP and he states he is not concerned with another ectopic at this point for patient to have hcg and progesterone completed for baseline. He said she should not have any pain or symptoms at this point if it was ectopic. She is aware of signs to watch for and will schedule NOB with US for after 6 weeks once labs come back.

## 2025-03-08 LAB
HCG INTACT+B SERPL-ACNC: 212 MIU/ML
PROGEST SERPL-MCNC: 18.5 NG/ML

## 2025-03-10 ENCOUNTER — TELEPHONE (OUTPATIENT)
Dept: OBSTETRICS AND GYNECOLOGY | Facility: CLINIC | Age: 25
End: 2025-03-10
Payer: COMMERCIAL

## 2025-03-10 DIAGNOSIS — O09.10 PREGNANCY WITH HISTORY OF ECTOPIC PREGNANCY, ANTEPARTUM: Primary | ICD-10-CM

## 2025-03-11 ENCOUNTER — TELEPHONE (OUTPATIENT)
Dept: OBSTETRICS AND GYNECOLOGY | Facility: CLINIC | Age: 25
End: 2025-03-11
Payer: COMMERCIAL

## 2025-03-11 ENCOUNTER — LAB (OUTPATIENT)
Dept: INTERNAL MEDICINE | Facility: CLINIC | Age: 25
End: 2025-03-11
Payer: COMMERCIAL

## 2025-03-11 DIAGNOSIS — Z34.00 INITIAL OBSTETRIC VISIT, ANTEPARTUM: Primary | ICD-10-CM

## 2025-03-11 DIAGNOSIS — O09.10 PREGNANCY WITH HISTORY OF ECTOPIC PREGNANCY, ANTEPARTUM: Primary | ICD-10-CM

## 2025-03-11 PROCEDURE — 84702 CHORIONIC GONADOTROPIN TEST: CPT | Performed by: OBSTETRICS & GYNECOLOGY

## 2025-03-11 NOTE — TELEPHONE ENCOUNTER
LMP 2/9/2025. Approx GA 4w2d.     Intermittent mid pelvic cramping and right pelvic cramping, rated 1/10. Hx left ectopic pregnancy 2023. S/P left salpingectomy. Denies bleeding.    3/7/2025 , Progesterone 18.5. Patient reports beta HCG performed today at work. Will review and notify patient when resulted. Tylenol sparingly PRN mild pain. Call for US if pain is worsening or bleeding starts. ED for severe pain. Ectopic precautions reviewed.

## 2025-03-12 ENCOUNTER — TELEPHONE (OUTPATIENT)
Dept: OBSTETRICS AND GYNECOLOGY | Facility: CLINIC | Age: 25
End: 2025-03-12
Payer: COMMERCIAL

## 2025-03-12 NOTE — TELEPHONE ENCOUNTER
Recent hcg results reviewed. There was an appropriate rise from previous result. Pt has no complaints. Has a NOB appt scheduled for 3/27/25. Will call sooner for pain or bleeding.

## 2025-03-12 NOTE — TELEPHONE ENCOUNTER
Caller: Evelyn Lawrence    Relationship: Self    Best call back number: [phone]    Who are you requesting to speak with (clinical staff, DR. BARBOSA    What was the call regarding: PATIENT CALLED ABOUT LAB RESULTS PLEASE ASSIST.     .”

## 2025-03-13 ENCOUNTER — LAB (OUTPATIENT)
Dept: INTERNAL MEDICINE | Facility: CLINIC | Age: 25
End: 2025-03-13
Payer: COMMERCIAL

## 2025-03-13 DIAGNOSIS — O09.10 PREGNANCY WITH HISTORY OF ECTOPIC PREGNANCY, ANTEPARTUM: Primary | ICD-10-CM

## 2025-03-13 DIAGNOSIS — O09.10 PREGNANCY WITH HISTORY OF ECTOPIC PREGNANCY, ANTEPARTUM: ICD-10-CM

## 2025-03-13 PROCEDURE — 84702 CHORIONIC GONADOTROPIN TEST: CPT | Performed by: OBSTETRICS & GYNECOLOGY

## 2025-03-13 NOTE — PROGRESS NOTES
Repeat Hcg ordered per pt request. Advised her to continue to monitor for bleeding and unilateral pain due to history of ectopic pregnancy.

## 2025-03-14 ENCOUNTER — OFFICE VISIT (OUTPATIENT)
Dept: INTERNAL MEDICINE | Facility: CLINIC | Age: 25
End: 2025-03-14
Payer: COMMERCIAL

## 2025-03-14 ENCOUNTER — TELEPHONE (OUTPATIENT)
Dept: OBSTETRICS AND GYNECOLOGY | Facility: CLINIC | Age: 25
End: 2025-03-14
Payer: COMMERCIAL

## 2025-03-14 VITALS
SYSTOLIC BLOOD PRESSURE: 102 MMHG | DIASTOLIC BLOOD PRESSURE: 66 MMHG | HEIGHT: 66 IN | TEMPERATURE: 97.5 F | RESPIRATION RATE: 14 BRPM | BODY MASS INDEX: 27.61 KG/M2 | WEIGHT: 171.8 LBS | OXYGEN SATURATION: 96 % | HEART RATE: 76 BPM

## 2025-03-14 DIAGNOSIS — F41.9 ANXIETY AND DEPRESSION: Primary | ICD-10-CM

## 2025-03-14 DIAGNOSIS — F32.A ANXIETY AND DEPRESSION: Primary | ICD-10-CM

## 2025-03-14 DIAGNOSIS — O09.10 PREGNANCY WITH HISTORY OF ECTOPIC PREGNANCY, ANTEPARTUM: Primary | ICD-10-CM

## 2025-03-14 DIAGNOSIS — O09.10 PREGNANCY WITH HISTORY OF ECTOPIC PREGNANCY, ANTEPARTUM: ICD-10-CM

## 2025-03-14 LAB — HCG INTACT+B SERPL-ACNC: 2036 MIU/ML

## 2025-03-14 PROCEDURE — 99214 OFFICE O/P EST MOD 30 MIN: CPT | Performed by: NURSE PRACTITIONER

## 2025-03-14 NOTE — PROGRESS NOTES
Evelyn Lawrence presents to [facility] for     Chief Complaint  Chief Complaint   Patient presents with    Anxiety    Depression       PCP:  Zee Ervin APRN Subjective          Evelyn is a 24-year-old female who is here to follow-up on anxiety depression.  She feels as though symptoms are very well-managed.  She just found out that she is pregnant and she is very excited about that.  However, she is at nervous because she does have a history of tubal pregnancy.  She is followed OB/GYN.  She has an appointment within 1 week.  No abdominal pain, cramping, bleeding, spotting.      Pertinent negative symptoms include no abdominal pain, no anorexia, no joint pain, no change in stool, no chest pain, no chills, no congestion, no cough, no diaphoresis, no fatigue, no fever, no headaches, no joint swelling, no myalgias, no nausea, no neck pain, no numbness, no rash, no sore throat, no swollen glands, no dysuria, no vertigo, no visual change, no vomiting and no weakness.     PHQ-9 Depression Screening  Little interest or pleasure in doing things? Not at all   Feeling down, depressed, or hopeless? Not at all   PHQ-2 Total Score 0   Trouble falling or staying asleep, or sleeping too much? Several days   Feeling tired or having little energy? Several days   Poor appetite or overeating? Not at all   Feeling bad about yourself - or that you are a failure or have let yourself or your family down? Not at all   Trouble concentrating on things, such as reading the newspaper or watching television? Not at all   Moving or speaking so slowly that other people could have noticed? Or the opposite - being so fidgety or restless that you have been moving around a lot more than usual? Not at all     Thoughts that you would be better off dead, or of hurting yourself in some way? Not at all   PHQ-9 Total Score 2   If you checked off any problems, how difficult have these problems made it for you to  do your work, take care of things at home, or get along with other people? Not difficult at all       Anxiety CAROLE-7    Feeling nervous, anxious or on edge: Several days  Not being able to stop or control worrying: Several days  Worrying too much about different things: Several days  Trouble Relaxing: Not at all  Being so restless that it is hard to sit still: Not at all  Becoming easily annoyed or irritable: Not at all  Feeling afraid as if something awful might happen: Several days  CAROLE 7 Total Score: 4  If you checked any problems, how difficult have these problems made it for you to do your work, take care of things at home, or get along with other people: Not difficult at all     lmp    Health Maintenance:   Health Maintenance   Topic Date Due    HEPATITIS C SCREENING  Never done    ANNUAL PHYSICAL  Never done    COVID-19 Vaccine (3 - 2024-25 season) 09/01/2024    PAP SMEAR  09/16/2025    BMI FOLLOWUP  10/25/2025    TDAP/TD VACCINES (3 - Td or Tdap) 11/26/2034    INFLUENZA VACCINE  Completed    HPV VACCINES  Completed    Pneumococcal Vaccine 0-49  Aged Out         Review of Systems   Constitutional:  Negative for chills, diaphoresis, fatigue and fever.   HENT:  Negative for congestion, sore throat and swollen glands.    Respiratory:  Negative for cough.    Cardiovascular:  Negative for chest pain.   Gastrointestinal:  Negative for abdominal pain, anorexia, nausea and vomiting.   Genitourinary:  Negative for dysuria.   Musculoskeletal:  Negative for joint pain, myalgias and neck pain.   Skin:  Negative for rash.   Neurological:  Negative for vertigo, weakness and numbness.         No Known Allergies  Current Outpatient Medications on File Prior to Visit   Medication Sig Dispense Refill    buPROPion XL (Wellbutrin XL) 300 MG 24 hr tablet Take 1 tablet by mouth Daily. 90 tablet 1    sertraline (Zoloft) 50 MG tablet Take 1 tablet by mouth Daily. 90 tablet 1     No current facility-administered medications on file  "prior to visit.         The following portions of the patient's history were reviewed and updated as appropriate: allergies, current medications, past family history, past medical history, past social history, past surgical history and problem list and are available for review within electronic record    Objective     Result Review :                    Vital Signs:   /66 (BP Location: Right arm, Patient Position: Sitting, Cuff Size: Adult)   Pulse 76   Temp 97.5 °F (36.4 °C) (Infrared)   Resp 14   Ht 168 cm (66.14\")   Wt 77.9 kg (171 lb 12.8 oz)   SpO2 96%   BMI 27.61 kg/m²         Physical Exam  Vitals and nursing note reviewed.   Constitutional:       Appearance: Normal appearance. She is well-developed.   HENT:      Head: Normocephalic and atraumatic.   Eyes:      General: No scleral icterus.     Conjunctiva/sclera: Conjunctivae normal.   Neck:      Thyroid: No thyroid mass or thyromegaly.   Cardiovascular:      Rate and Rhythm: Normal rate and regular rhythm.      Heart sounds: Normal heart sounds.   Pulmonary:      Effort: Pulmonary effort is normal. No respiratory distress.      Breath sounds: Normal breath sounds.   Abdominal:      General: Bowel sounds are normal.      Palpations: Abdomen is soft.      Tenderness: There is no abdominal tenderness.   Musculoskeletal:         General: Normal range of motion.      Cervical back: Normal range of motion.      Right lower leg: No edema.      Left lower leg: No edema.   Skin:     General: Skin is warm and dry.   Neurological:      General: No focal deficit present.      Mental Status: She is alert and oriented to person, place, and time.   Psychiatric:         Attention and Perception: Attention and perception normal. She is attentive.         Mood and Affect: Mood and affect normal. Mood is anxious. Mood is not depressed. Affect is not angry or inappropriate.         Speech: Speech normal.         Behavior: Behavior normal. Behavior is cooperative.    "      Thought Content: Thought content normal. Thought content does not include homicidal or suicidal ideation. Thought content does not include homicidal or suicidal plan.         Cognition and Memory: Cognition and memory normal.         Judgment: Judgment normal.                 Assessment and Plan      Diagnoses and all orders for this visit:    1. Anxiety and depression (Primary)    2. Pregnancy with history of ectopic pregnancy, antepartum    Continue bupropion and Zoloft that she is on these prior to pregnancy.  I have encouraged her to speak with her GYN about the safety for her to continue doing her pregnancy as well to make sure they are okay with it    Follow Up     Patient was given instructions and counseling regarding her condition or for health maintenance advice. Please see specific information pulled into the AVS if appropriate.   Any new medication's adverse effects have been discussed in detail with patient  Patient was encouraged to keep me informed of any acute changes, lack of improvement, or any new concerning symptoms.    Return in about 3 months (around 6/14/2025) for chronic condition follow up.          Dictated Utilizing Dragon Dictation   Please note that portions of this note were completed with a voice recognition program.   Part of this note may be an electronic transcription/translation of spoken language to printed text using the Dragon Dictation System.

## 2025-03-14 NOTE — TELEPHONE ENCOUNTER
Spoke with Nate MUÑOZ, appropriate rise in HCG. Patient can have repeat labs. Patient will come Monday.

## 2025-03-14 NOTE — TELEPHONE ENCOUNTER
Caller: Evelyn Lawrence    Relationship: Self    Best call back number: [phone]    What is the best time to reach you: ANY    Who are you requesting to speak with (clinical staff, provider,  specific staff member): CLINICAL        What was the call regarding: PT IS REQUESTING A CALL BACK TO DISCUSS LAB RESULTS

## 2025-03-17 ENCOUNTER — LAB (OUTPATIENT)
Dept: INTERNAL MEDICINE | Facility: CLINIC | Age: 25
End: 2025-03-17
Payer: COMMERCIAL

## 2025-03-17 PROCEDURE — 84702 CHORIONIC GONADOTROPIN TEST: CPT | Performed by: OBSTETRICS & GYNECOLOGY

## 2025-03-18 LAB — HCG INTACT+B SERPL-ACNC: 5795 MIU/ML

## 2025-03-19 ENCOUNTER — TELEPHONE (OUTPATIENT)
Dept: OBSTETRICS AND GYNECOLOGY | Facility: CLINIC | Age: 25
End: 2025-03-19
Payer: COMMERCIAL

## 2025-03-19 NOTE — TELEPHONE ENCOUNTER
Caller: Melinda Evelynmarcia Wynn    Relationship: Self    Best call back number: [phone]    Caller requesting test results: YES    What test was performed: HCG    When was the test performed: 3/17/25     Where was the test performed: OFFICE    Additional notes: PT HAS SEEN RESULTS ARE BACK IN MYCHART AND WANTED TO KNOW WHAT NEXT STEP IS.

## 2025-03-19 NOTE — TELEPHONE ENCOUNTER
Patient of Dr. Negro; LOV 11/14/24. She is scheduled for NOB visit 03/27/25.  Returned patient's call.    LMP 02/09/25 = 5w 3d  Reports some occasional mild cramping, fatigue, and nausea.   Denies any pain or bleeding.   States she saw her HCG result in MyChart and asking what she needs to do next.  Informed her that, per [facility], Dr. Negro has reviewed the result but not made any result note or comment on it. Providers usually have staff inform patient of any concerns or recommendations related to results.   Advised to keep NOB visit as scheduled and call for any problems or concerns.   Patient v/u and agreed.

## 2025-03-21 ENCOUNTER — TELEPHONE (OUTPATIENT)
Dept: OBSTETRICS AND GYNECOLOGY | Facility: CLINIC | Age: 25
End: 2025-03-21
Payer: COMMERCIAL

## 2025-03-21 NOTE — TELEPHONE ENCOUNTER
Pt says that she started having a white discharge which started yesterday. No pelvic pain, cramping or bleeding.

## 2025-03-21 NOTE — TELEPHONE ENCOUNTER
Patient reports an increase in clear/white discharge. She denies any other symptoms. Advised patient this is normal in pregnancy. If symptoms persist/worsen let us know.

## 2025-03-25 RX ORDER — TRIAMCINOLONE ACETONIDE 1 MG/G
1 OINTMENT TOPICAL 2 TIMES DAILY
Qty: 15 G | Refills: 1 | Status: SHIPPED | OUTPATIENT
Start: 2025-03-25 | End: 2025-03-25

## 2025-03-25 RX ORDER — TRIAMCINOLONE ACETONIDE 1 MG/G
1 OINTMENT TOPICAL 2 TIMES DAILY
Qty: 15 G | Refills: 1 | Status: SHIPPED | OUTPATIENT
Start: 2025-03-25

## 2025-03-27 ENCOUNTER — INITIAL PRENATAL (OUTPATIENT)
Dept: OBSTETRICS AND GYNECOLOGY | Facility: CLINIC | Age: 25
End: 2025-03-27
Payer: COMMERCIAL

## 2025-03-27 VITALS — DIASTOLIC BLOOD PRESSURE: 62 MMHG | WEIGHT: 170.6 LBS | BODY MASS INDEX: 27.42 KG/M2 | SYSTOLIC BLOOD PRESSURE: 108 MMHG

## 2025-03-27 DIAGNOSIS — Z34.90 PRENATAL CARE, ANTEPARTUM: Primary | ICD-10-CM

## 2025-03-27 RX ORDER — ASPIRIN 81 MG/1
81 TABLET, CHEWABLE ORAL DAILY
COMMUNITY

## 2025-03-27 RX ORDER — PRENATAL VIT NO.126/IRON/FOLIC 28MG-0.8MG
TABLET ORAL DAILY
COMMUNITY

## 2025-03-27 NOTE — PROGRESS NOTES
Initial ob visit     CC- Here for care of pregnancy        Evelyn Lawrence is a 24 y.o. female, , who presents for her first obstetrical visit.  Patient's last menstrual period was 2025 (exact date).. Her YESSICA is 2025, by Last Menstrual Period. Current GA is 6w4d.     She has a small rash behind RT ear that started on Saturday 3/22. She is unsure what this is from but she was prescribed Triamcinolone cream to put on rash. She feels like it's improving with the cream. She denies any new soaps, lotions or fragrances.     Initial positive test date : 3/4/25, UPT        Her periods are every 28 days .  Prior obstetric issues: ectopic 2023  Patient's past medical history is significant for:  no. .  Family history of genetic issues (includes FOB): no.  Prior infections concerning in pregnancy (Rash, fever in last 2 weeks): Yes, Flu + a few weeks ago.   Varicella Hx - history of chicken pox  Prior testing for Cystic Fibrosis Carrier or Sickle Cell Trait- no.  Prepregnancy BMI - Body mass index is 27.42 kg/m².  History of STD: no  Hx of HSV for patient or partner: no  Ultrasound Today: yes    OB History    Para Term  AB Living   2 0 0 0 1 0   SAB IAB Ectopic Molar Multiple Live Births   0 0 1 0 0 0      # Outcome Date GA Lbr Reid/2nd Weight Sex Type Anes PTL Lv   2 Current            1 Ectopic 2023              Obstetric Comments   2023- ectopic pregnancy- LT salpingectomy       Additional Pertinent History   Last Pap : 2024- At [place]- WNL     Last Completed Pap Smear            Upcoming       PAP SMEAR (Every 3 Years) Next due on 2022  LIQUID-BASED PAP SMEAR, [facility] ([facility])                          History of abnormal Pap smear: no  Family history of uterine, colon, breast, or ovarian cancer: no  Feelings of Anxiety or Depression: yes - well controlled on Zoloft  Tobacco Usage?: No   Alcohol/Drug Use?: NO  Over the age of 35 at delivery:  no  Genetic Screening: desires to discuss in the future  Flu Status: Already given in current flu season    PMH    Current Outpatient Medications:     aspirin 81 MG chewable tablet, Chew 1 tablet Daily., Disp: , Rfl:     prenatal vitamin (prenatal, CLASSIC, vitamin) tablet, Take  by mouth Daily., Disp: , Rfl:     sertraline (Zoloft) 50 MG tablet, Take 1 tablet by mouth Daily., Disp: 90 tablet, Rfl: 1    triamcinolone (KENALOG) 0.1 % ointment, Apply 1 Application topically to the appropriate area as directed 2 (Two) Times a Day., Disp: 15 g, Rfl: 1     Past Medical History:   Diagnosis Date    Anxiety     Depression February 2022    Ectopic pregnancy 08/28/2023    Ovarian cyst     I’ve had them in the past. Last in 2019    Scoliosis     Seizures     Temporal partial     Urinary tract infection     I’ve had them in the past    Varicella     I was in elementary school        Past Surgical History:   Procedure Laterality Date    SALPINGECTOMY Left     8/2023    WISDOM TOOTH EXTRACTION         Review of Systems   Review of Systems    Patient Reports:  none  Patient Denies:excessive nausea , excessive vomiting, and vaginal bleeding  All systems reviewed and otherwise normal.    I have reviewed and agree with the HPI, ROS, and historical information as entered above. Sushant Negro MD      /62   Wt 77.4 kg (170 lb 9.6 oz)   LMP 02/09/2025 (Exact Date)   BMI 27.42 kg/m²     The additional following portions of the patient's history were reviewed and updated as appropriate: allergies, current medications, past family history, past medical history, past social history, past surgical history, and problem list.    Physical Exam  General:  well developed; well nourished  no acute distress  mentation appropriate   Chest/Respiratory: No labored breathing, normal respiratory effort, normal appearance, no respiratory noises noted   Heart:  normal rate, regular rhythm,  no murmurs, rubs, or gallops   Thyroid: normal to  inspection and palpation   Breasts:  Not performed.   Abdomen: soft, non-tender; no masses  no umbilical or inguinal hernias are present  no hepato-splenomegaly   Pelvis: Not performed.        Assessment and Plan    Problem List Items Addressed This Visit    None  Visit Diagnoses         Prenatal care, antepartum    -  Primary    Relevant Orders    Obstetric Panel    HIV-1 / O / 2 Ag / Antibody    Urine Culture - Urine, Urine, Clean Catch    Urinalysis With Microscopic - Urine, Clean Catch    Chlamydia trachomatis, Neisseria gonorrhoeae, PCR - Urine, Urine, Clean Catch    Urine Drug Screen - Urine, Clean Catch    [id number] PLUS Core+SCA+ESS - Blood,            Pregnancy at 6w4d  Reviewed routine prenatal care with the office and educational materials given  Lab(s) Ordered  Discussed options for genetic testing including first trimester nuchal translucency screen, genetic disease carrier testing, quadruple screen, and NIPT  Patient is on Prenatal vitamins  Return in about 1 month (around 4/27/2025).      Sushant Negro MD  03/27/2025

## 2025-03-28 LAB
ABO GROUP BLD: ABNORMAL
AMPHETAMINES UR QL SCN: NEGATIVE NG/ML
APPEARANCE UR: CLEAR
BACTERIA #/AREA URNS HPF: ABNORMAL /[HPF]
BARBITURATES UR QL SCN: NEGATIVE NG/ML
BASOPHILS # BLD AUTO: 0.1 X10E3/UL (ref 0–0.2)
BASOPHILS NFR BLD AUTO: 1 %
BENZODIAZ UR QL SCN: NEGATIVE NG/ML
BILIRUB UR QL STRIP: NEGATIVE
BLD GP AB SCN SERPL QL: NEGATIVE
BZE UR QL SCN: NEGATIVE NG/ML
CANNABINOIDS UR QL SCN: NEGATIVE NG/ML
CASTS URNS QL MICRO: ABNORMAL /LPF
COLOR UR: YELLOW
CREAT UR-MCNC: 50 MG/DL (ref 20–300)
EOSINOPHIL # BLD AUTO: 0.1 X10E3/UL (ref 0–0.4)
EOSINOPHIL NFR BLD AUTO: 1 %
EPI CELLS #/AREA URNS HPF: ABNORMAL /HPF (ref 0–10)
ERYTHROCYTE [DISTWIDTH] IN BLOOD BY AUTOMATED COUNT: 11.8 % (ref 11.7–15.4)
GLUCOSE UR QL STRIP: NEGATIVE
HBV SURFACE AG SERPL QL IA: NEGATIVE
HCT VFR BLD AUTO: 39.6 % (ref 34–46.6)
HCV IGG SERPL QL IA: NON REACTIVE
HGB BLD-MCNC: 13.1 G/DL (ref 11.1–15.9)
HGB UR QL STRIP: NEGATIVE
HIV 1+2 AB+HIV1 P24 AG SERPL QL IA: NON REACTIVE
IMM GRANULOCYTES # BLD AUTO: 0 X10E3/UL (ref 0–0.1)
IMM GRANULOCYTES NFR BLD AUTO: 0 %
KETONES UR QL STRIP: NEGATIVE
LABORATORY COMMENT REPORT: NORMAL
LEUKOCYTE ESTERASE UR QL STRIP: ABNORMAL
LYMPHOCYTES # BLD AUTO: 1.3 X10E3/UL (ref 0.7–3.1)
LYMPHOCYTES NFR BLD AUTO: 21 %
MCH RBC QN AUTO: 32.9 PG (ref 26.6–33)
MCHC RBC AUTO-ENTMCNC: 33.1 G/DL (ref 31.5–35.7)
MCV RBC AUTO: 100 FL (ref 79–97)
METHADONE UR QL SCN: NEGATIVE NG/ML
MICRO URNS: ABNORMAL
MONOCYTES # BLD AUTO: 0.6 X10E3/UL (ref 0.1–0.9)
MONOCYTES NFR BLD AUTO: 9 %
NEUTROPHILS # BLD AUTO: 4.3 X10E3/UL (ref 1.4–7)
NEUTROPHILS NFR BLD AUTO: 68 %
NITRITE UR QL STRIP: NEGATIVE
OPIATES UR QL SCN: NEGATIVE NG/ML
OXYCODONE+OXYMORPHONE UR QL SCN: NEGATIVE NG/ML
PCP UR QL: NEGATIVE NG/ML
PH UR STRIP: 6 [PH] (ref 5–7.5)
PH UR: 5.2 [PH] (ref 4.5–8.9)
PLATELET # BLD AUTO: 217 X10E3/UL (ref 150–450)
PROPOXYPH UR QL SCN: NEGATIVE NG/ML
PROT UR QL STRIP: NEGATIVE
RBC # BLD AUTO: 3.98 X10E6/UL (ref 3.77–5.28)
RBC #/AREA URNS HPF: ABNORMAL /HPF (ref 0–2)
RH BLD: POSITIVE
RPR SER QL: NON REACTIVE
RUBV IGG SERPL IA-ACNC: 3.68 INDEX
SP GR UR STRIP: 1.01 (ref 1–1.03)
UROBILINOGEN UR STRIP-MCNC: 0.2 MG/DL (ref 0.2–1)
WBC # BLD AUTO: 6.3 X10E3/UL (ref 3.4–10.8)
WBC #/AREA URNS HPF: >30 /HPF (ref 0–5)

## 2025-03-29 LAB
C TRACH RRNA SPEC QL NAA+PROBE: NEGATIVE
N GONORRHOEA RRNA SPEC QL NAA+PROBE: NEGATIVE

## 2025-03-31 ENCOUNTER — TELEPHONE (OUTPATIENT)
Dept: OBSTETRICS AND GYNECOLOGY | Facility: CLINIC | Age: 25
End: 2025-03-31
Payer: COMMERCIAL

## 2025-03-31 DIAGNOSIS — O20.8 SUBCHORIONIC HEMORRHAGE OF PLACENTA IN FIRST TRIMESTER: Primary | ICD-10-CM

## 2025-03-31 LAB
BACTERIA UR CULT: NORMAL
BACTERIA UR CULT: NORMAL

## 2025-03-31 NOTE — TELEPHONE ENCOUNTER
Patient of Dr. Negro;  @ 7w 2d. LOV 25 for NOB visit; NOV 25.  Returned patient's call.   Reviewed lab results; urine culture pending.   Asking if she should continue taking ASA since XAVI was noted on ultrasound.  Also states Dr. Negro told her she needed another ultrasound at next visit but didn't put it on her paper so check out did not schedule it.   Discussed with TONI Delatorre. She states okay to continue ASA.   Informed patient. Ultrasound added to next appointment. Patient v/u and agreed.

## 2025-03-31 NOTE — TELEPHONE ENCOUNTER
Provider:     Caller: MORRIS GONZALEZ    Relationship to Patient: SELF     Phone Number: [phone] V/M    Reason for Call: PT WOULD LIKE TO DISCUSS LAB RESULTS SHE SEEN ON HER MYCHART - CONCERNS AND DOESN'T KNOW WHAT THE RESULTS MEAN WANTS TO MAKE SURE ITS NOTHING THAT NEEDS TO BE ADDRESSED     PLEASE GIVE PT CALL BACK TO DISCUSS     THANK YOU

## 2025-04-01 ENCOUNTER — TELEPHONE (OUTPATIENT)
Dept: OBSTETRICS AND GYNECOLOGY | Facility: CLINIC | Age: 25
End: 2025-04-01
Payer: COMMERCIAL

## 2025-04-01 NOTE — TELEPHONE ENCOUNTER
Caller: Evelyn Lawrence    Relationship: Self    Best call back number: [phone]     What is the best time to reach you: CALL ANYTIME    Who are you requesting to speak with (clinical staff, provider,  specific staff member): MARTIN    PATIENT RETURNED MARTIN'S CALL. HUB UNABLE TO TRANSFER TO CLINICAL LINE.

## 2025-04-01 NOTE — TELEPHONE ENCOUNTER
Returned patient's call.   Reports onset of cramping last night; intermittent; below umbilicus; mild to moderate. Had one loose stool this morning and 2 normal stools since.   Has been having intermittent nausea with pregnancy.   Denies any bleeding.   Has been able to retain food and fluids today.   Patient asked if cramping could be from XAVI that was seen on ultrasound. Discussed that it could be. Advised to monitor and call for worsening cramping/pain or bleeding. Symptoms might also be from a GI virus. If GI symptoms worsen, focus on adequate hydration until virus runs it course.   Patient v/u and agreed.

## 2025-04-01 NOTE — TELEPHONE ENCOUNTER
Patient of Dr. Negro;  @ 7w 3d. LOV 25 for NOB visit; NOV 25.   Attempted to return patient's call. Left voice message to call us back.

## 2025-04-01 NOTE — TELEPHONE ENCOUNTER
Caller: Evelyn Lawrence    Relationship: Self    Best call back number: [phone]    What is the best time to reach you: ANY    Who are you requesting to speak with (clinical staff, provider,  specific staff member): CLINICAL       What was the call regarding: PT STATES SHE STARTED CRAMPING LAST NIGHT; TOOK TYLENOL AND IT HELPED. CRAMPING HAS STARTED AGAIN AND NOW HAS DIARRHEA AS WELL   UNABLE TO WT; REQUESTING CALL BACK TO DISCUSS

## 2025-04-04 ENCOUNTER — TELEPHONE (OUTPATIENT)
Dept: OBSTETRICS AND GYNECOLOGY | Facility: CLINIC | Age: 25
End: 2025-04-04
Payer: COMMERCIAL

## 2025-04-04 NOTE — TELEPHONE ENCOUNTER
Caller: Evelyn Lawrence     Relationship: SELF    Best call back number: [phone] V/M    What is your medical concern? 7 WEEKS PREGNANT CONCERNS ON PREGNANCY SYMPTOMS COMING AND GOING - SUCH AS ONE DAY BREAST TENDER + NAUSEOUS THEN THE NEXT DAY SYMPTOMS ARE NON EXISTING WANTS TO ENSURE THIS IS NORMAL AND NO NEED TO BE CONCERNED     PLEASE REACH OUT TO TALK TO PT TO DISCUSS    THANK YOU

## 2025-04-09 ENCOUNTER — TELEPHONE (OUTPATIENT)
Dept: OBSTETRICS AND GYNECOLOGY | Facility: CLINIC | Age: 25
End: 2025-04-09

## 2025-04-09 NOTE — TELEPHONE ENCOUNTER
Provider: DR. BARBOSA    Caller: Evelyn Lawrence    Relationship to Patient: Self    Pharmacy: [email or web address]    Phone Number: [phone]    Reason for Call: OB PT ADV SHE IS HAVING YELLOW DISCHARGE, ON/OFF SINCE YESTERDAY. NO OTHER SYMPTOMS OTHER THAN NORMAL PREG SYMPTOMS.    When was the patient last seen: 03-27

## 2025-04-14 ENCOUNTER — APPOINTMENT (OUTPATIENT)
Dept: ULTRASOUND IMAGING | Facility: HOSPITAL | Age: 25
End: 2025-04-14
Payer: COMMERCIAL

## 2025-04-14 ENCOUNTER — HOSPITAL ENCOUNTER (EMERGENCY)
Facility: HOSPITAL | Age: 25
Discharge: HOME | End: 2025-04-14
Attending: EMERGENCY MEDICINE | Admitting: EMERGENCY MEDICINE
Payer: COMMERCIAL

## 2025-04-14 ENCOUNTER — TELEPHONE (OUTPATIENT)
Dept: OBSTETRICS AND GYNECOLOGY | Facility: CLINIC | Age: 25
End: 2025-04-14
Payer: COMMERCIAL

## 2025-04-14 VITALS
BODY MASS INDEX: 26.84 KG/M2 | HEIGHT: 67 IN | SYSTOLIC BLOOD PRESSURE: 113 MMHG | DIASTOLIC BLOOD PRESSURE: 73 MMHG | RESPIRATION RATE: 18 BRPM | OXYGEN SATURATION: 98 % | TEMPERATURE: 98.3 F | HEART RATE: 80 BPM | WEIGHT: 171 LBS

## 2025-04-14 DIAGNOSIS — O20.0 THREATENED ABORTION: ICD-10-CM

## 2025-04-14 DIAGNOSIS — O46.8X1 SUBCHORIONIC HEMATOMA IN FIRST TRIMESTER, SINGLE OR UNSPECIFIED FETUS: ICD-10-CM

## 2025-04-14 DIAGNOSIS — R10.31 RIGHT LOWER QUADRANT PAIN: Primary | ICD-10-CM

## 2025-04-14 DIAGNOSIS — O41.8X10 SUBCHORIONIC HEMATOMA IN FIRST TRIMESTER, SINGLE OR UNSPECIFIED FETUS: ICD-10-CM

## 2025-04-14 LAB
ABO GROUP BLD: NORMAL
ALBUMIN SERPL-MCNC: 4.1 G/DL (ref 3.5–5.2)
ALBUMIN/GLOB SERPL: 1.6 G/DL
ALP SERPL-CCNC: 47 U/L (ref 39–117)
ALT SERPL W P-5'-P-CCNC: 15 U/L (ref 1–33)
ANION GAP SERPL CALCULATED.3IONS-SCNC: 12 MMOL/L (ref 5–15)
AST SERPL-CCNC: 18 U/L (ref 1–32)
BASOPHILS # BLD AUTO: 0.08 10*3/MM3 (ref 0–0.2)
BASOPHILS NFR BLD AUTO: 1 % (ref 0–1.5)
BILIRUB SERPL-MCNC: 0.5 MG/DL (ref 0–1.2)
BUN SERPL-MCNC: 8 MG/DL (ref 6–20)
BUN/CREAT SERPL: 14.8 (ref 7–25)
CALCIUM SPEC-SCNC: 8.7 MG/DL (ref 8.6–10.5)
CHLORIDE SERPL-SCNC: 102 MMOL/L (ref 98–107)
CO2 SERPL-SCNC: 21 MMOL/L (ref 22–29)
CREAT SERPL-MCNC: 0.54 MG/DL (ref 0.57–1)
DEPRECATED RDW RBC AUTO: 40 FL (ref 37–54)
EGFRCR SERPLBLD CKD-EPI 2021: 131.2 ML/MIN/1.73
EOSINOPHIL # BLD AUTO: 0.09 10*3/MM3 (ref 0–0.4)
EOSINOPHIL NFR BLD AUTO: 1.1 % (ref 0.3–6.2)
ERYTHROCYTE [DISTWIDTH] IN BLOOD BY AUTOMATED COUNT: 11.7 % (ref 12.3–15.4)
GLOBULIN UR ELPH-MCNC: 2.6 GM/DL
GLUCOSE SERPL-MCNC: 97 MG/DL (ref 65–99)
HCG INTACT+B SERPL-ACNC: NORMAL MIU/ML
HCT VFR BLD AUTO: 37.2 % (ref 34–46.6)
HGB BLD-MCNC: 13.1 G/DL (ref 12–15.9)
HOLD SPECIMEN: NORMAL
IMM GRANULOCYTES # BLD AUTO: 0.02 10*3/MM3 (ref 0–0.05)
IMM GRANULOCYTES NFR BLD AUTO: 0.2 % (ref 0–0.5)
LIPASE SERPL-CCNC: 26 U/L (ref 13–60)
LYMPHOCYTES # BLD AUTO: 1.64 10*3/MM3 (ref 0.7–3.1)
LYMPHOCYTES NFR BLD AUTO: 20 % (ref 19.6–45.3)
MCH RBC QN AUTO: 33.1 PG (ref 26.6–33)
MCHC RBC AUTO-ENTMCNC: 35.2 G/DL (ref 31.5–35.7)
MCV RBC AUTO: 93.9 FL (ref 79–97)
MONOCYTES # BLD AUTO: 0.52 10*3/MM3 (ref 0.1–0.9)
MONOCYTES NFR BLD AUTO: 6.3 % (ref 5–12)
NEUTROPHILS NFR BLD AUTO: 5.85 10*3/MM3 (ref 1.7–7)
NEUTROPHILS NFR BLD AUTO: 71.4 % (ref 42.7–76)
NRBC BLD AUTO-RTO: 0 /100 WBC (ref 0–0.2)
NUMBER OF DOSES: NORMAL
PLATELET # BLD AUTO: 234 10*3/MM3 (ref 140–450)
PMV BLD AUTO: 11.2 FL (ref 6–12)
POTASSIUM SERPL-SCNC: 3.6 MMOL/L (ref 3.5–5.2)
PROT SERPL-MCNC: 6.7 G/DL (ref 6–8.5)
RBC # BLD AUTO: 3.96 10*6/MM3 (ref 3.77–5.28)
RH BLD: POSITIVE
SODIUM SERPL-SCNC: 135 MMOL/L (ref 136–145)
WBC NRBC COR # BLD AUTO: 8.2 10*3/MM3 (ref 3.4–10.8)
WHOLE BLOOD HOLD COAG: NORMAL
WHOLE BLOOD HOLD SPECIMEN: NORMAL

## 2025-04-14 PROCEDURE — 93976 VASCULAR STUDY: CPT

## 2025-04-14 PROCEDURE — 76705 ECHO EXAM OF ABDOMEN: CPT

## 2025-04-14 PROCEDURE — 76817 TRANSVAGINAL US OBSTETRIC: CPT

## 2025-04-14 PROCEDURE — 86900 BLOOD TYPING SEROLOGIC ABO: CPT | Performed by: PHYSICIAN ASSISTANT

## 2025-04-14 PROCEDURE — 84702 CHORIONIC GONADOTROPIN TEST: CPT | Performed by: EMERGENCY MEDICINE

## 2025-04-14 PROCEDURE — 83690 ASSAY OF LIPASE: CPT | Performed by: EMERGENCY MEDICINE

## 2025-04-14 PROCEDURE — 80053 COMPREHEN METABOLIC PANEL: CPT | Performed by: EMERGENCY MEDICINE

## 2025-04-14 PROCEDURE — 99284 EMERGENCY DEPT VISIT MOD MDM: CPT

## 2025-04-14 PROCEDURE — 36415 COLL VENOUS BLD VENIPUNCTURE: CPT

## 2025-04-14 PROCEDURE — 85025 COMPLETE CBC W/AUTO DIFF WBC: CPT | Performed by: EMERGENCY MEDICINE

## 2025-04-14 PROCEDURE — 86901 BLOOD TYPING SEROLOGIC RH(D): CPT | Performed by: PHYSICIAN ASSISTANT

## 2025-04-14 RX ORDER — SODIUM CHLORIDE 0.9 % (FLUSH) 0.9 %
10 SYRINGE (ML) INJECTION AS NEEDED
Status: DISCONTINUED | OUTPATIENT
Start: 2025-04-14 | End: 2025-04-14 | Stop reason: HOSPADM

## 2025-04-14 NOTE — TELEPHONE ENCOUNTER
"Provider: DR. BARBOSA    Caller: Evelyn Lawrence    Relationship to Patient: Self    Pharmacy: [facility] @ [place], [place]    Phone Number: [phone] CAN LVM IF NA AND MYCHART MSG    Reason for Call: OB PT - ADV ON FRI 4-11 EVENING SHE EXPERIENCE R/SIDE LOWER AB PAIN FOR ABOUT 1 HR AND NAUSEA. HAS NOT HAD THE PAIN SINCE THEN, BUT IS HAVING NAUSEA THAT IS ON/OFF AND FEELS ANXIOUS, SAYS SHE FEELS \"OFF\" ALSO FEELS VERY TIRED. NA AT OFFICE    When was the patient last seen: 03-27      "
Patient of Dr. Negro;  @ 9w 2d. XAVI noted @ 6w 4d. LOV 25 for NOB visit; NOV 25.  Returned patient's call.   States she had throbbing RLQ pain 25 that lasted an hour; none since. States occurred after she laughed very hard. Has not had any pain since.  Having some nausea with pregnancy.  Reports that she was taking Zoloft and Wellbutrin prior to pregnancy for anxiety. She stopped the Wellbutrin prior to initial prenatal visit; continued Zoloft.   States Dr. Negro told her it was okay not to take the Wellbutrin.   States she is not doing well off the Wellbutrin; is having significant anxiety.  Advised her that Wellbutrin is okay to take in pregnancy; she needs to contact her PCP that prescribes it to discuss restarting. Call if abdominal pain recurs.   Patient v/u and agreed.   
Detail Level: Detailed
Detail Level: Zone

## 2025-04-15 ENCOUNTER — TELEPHONE (OUTPATIENT)
Dept: OBSTETRICS AND GYNECOLOGY | Facility: CLINIC | Age: 25
End: 2025-04-15
Payer: COMMERCIAL

## 2025-04-15 NOTE — TELEPHONE ENCOUNTER
MORRIS LISA     [phone]    PT IS 9wks    SEEN IN ER YESTERDAY FOR ABD PAINS     PT IS SCHEDULED FOR 4/24/25    PT STATES TODAY SHE'S FEELING BETTER ABD PAINS GONE,

## 2025-04-15 NOTE — TELEPHONE ENCOUNTER
Notified patient per OP stop baby ASA x2 weeks. Keep follow up next week. She vu.call with bleeding/pain

## 2025-04-15 NOTE — ED PROVIDER NOTES
Subjective   History of Present Illness  24-year-old female presents emergency department today with right lower quadrant pain.  States it has been going on for about 4 days initially Friday had the pain and then it seemed to resolve on Saturday and Sunday and then returned today.  She has not had any dysuria frequency urgency or hematuria.  She did have urinary symptoms about 3 to 4 weeks ago.  She reports that she has had no vaginal bleeding.  She has had an ultrasound previously that noted an intrauterine pregnancy.  Pain is intermittent and occasional.  She did have a previous ectopic pregnancy on the left.    History provided by:  Patient   used: No    Abdominal Pain  Pain location:  RLQ  Pain quality: cramping and dull    Pain radiates to:  Does not radiate  Pain severity:  Moderate  Onset quality:  Gradual  Duration:  4 days  Timing:  Intermittent  Progression:  Partially resolved  Chronicity:  New  Context: previous surgery    Context: not recent sexual activity, not recent travel, not sick contacts and not trauma    Context comment:  Documented intrauterine pregnancy at 9 weeks.  Relieved by:  Nothing  Worsened by:  Movement  Ineffective treatments:  None tried  Associated symptoms: nausea    Associated symptoms: no anorexia, no chest pain, no constipation, no dysuria, no fever, no hematemesis, no hematochezia, no hematuria, no shortness of breath, no vaginal bleeding, no vaginal discharge and no vomiting    Risk factors: no alcohol abuse, no NSAID use and no recent hospitalization        Review of Systems   Constitutional:  Negative for fever.   Respiratory:  Negative for chest tightness, shortness of breath and wheezing.    Cardiovascular:  Negative for chest pain.   Gastrointestinal:  Positive for abdominal pain and nausea. Negative for anorexia, constipation, hematemesis, hematochezia and vomiting.   Genitourinary:  Negative for dysuria, frequency, hematuria, vaginal bleeding and  vaginal discharge.       Past Medical History:   Diagnosis Date   • Anxiety    • Depression February 2022   • Ectopic pregnancy 08/28/2023   • Ovarian cyst     I’ve had them in the past. Last in 2019   • Scoliosis    • Seizures     Temporal partial    • Urinary tract infection     I’ve had them in the past   • Varicella     I was in elementary school       No Known Allergies    Past Surgical History:   Procedure Laterality Date   • SALPINGECTOMY Left     8/2023   • WISDOM TOOTH EXTRACTION         Family History   Problem Relation Age of Onset   • Seizures Mother    • Other (lupus) Mother    • Lupus Mother    • Depression Mother    • Mental illness Mother         Bipolar   • Heart attack Father 47   • Hyperlipidemia Maternal Grandmother    • Diabetes Maternal Grandmother    • COPD Paternal Grandfather    • Diabetes Paternal Grandfather    • Emphysema Paternal Grandfather    • Depression Sister    • Mental illness Sister         Bipolar   • Breast cancer Neg Hx    • Ovarian cancer Neg Hx    • Uterine cancer Neg Hx    • Colon cancer Neg Hx        Social History     Socioeconomic History   • Marital status:    Tobacco Use   • Smoking status: Never   • Smokeless tobacco: Never   Vaping Use   • Vaping status: Never Used   Substance and Sexual Activity   • Alcohol use: Not Currently     Comment: I dont drink alot. Maybe once in a couple months   • Drug use: Never   • Sexual activity: Yes     Partners: Male     Birth control/protection: None     Comment: We are trying to get pregant           Objective   Physical Exam  Vitals and nursing note reviewed.   Constitutional:       General: She is not in acute distress.     Appearance: She is well-developed. She is not diaphoretic.   HENT:      Head: Normocephalic and atraumatic.      Nose: Nose normal.   Eyes:      General: No scleral icterus.     Conjunctiva/sclera: Conjunctivae normal.   Cardiovascular:      Rate and Rhythm: Normal rate and regular rhythm.      Heart  sounds: Normal heart sounds. No murmur heard.  Pulmonary:      Effort: Pulmonary effort is normal. No respiratory distress.      Breath sounds: Normal breath sounds.   Abdominal:      General: Bowel sounds are normal.      Palpations: Abdomen is soft.      Tenderness: There is abdominal tenderness. There is no guarding or rebound.   Musculoskeletal:         General: Normal range of motion.      Cervical back: Normal range of motion and neck supple.   Skin:     General: Skin is warm and dry.   Neurological:      Mental Status: She is alert and oriented to person, place, and time.   Psychiatric:         Behavior: Behavior normal.       Procedures           ED Course  ED Course as of 04/14/25 2136 Mon Apr 14, 2025 2100 Laboratory data white count is 8.2 with an H&H of 13 and 37 and a platelet count of 234 CMP glucose 97 BUN of 8 creatinine of 0.54 sodium 135 potassium 3.6.  Patient's LFTs were completely unremarkable.  She is O+ blood type.  Her quant hCG is 85,000.  Her urinalysis for some reason is not reported out but she does not any urinary symptoms and does not want to wait. [DAVIN]   2131 We discussed the ultrasound findings.  She states that she feels improved she was wanting more testing we did discuss MRI of the right lower quadrant to rule out appendicitis.  She states that she feels worse she will return.  She is having no vaginal bleeding.  She has a normal white count.  Her exam she  in the right lower quadrant area but not at McBurney's point she has no rebound or rigidity. [DAVIN]   2131 I did advise her return of bleeding greater than 2 pads an hour for 2 or more consecutive hours if her abdominal pain does not improve or gets worse or any other problems or complaints. [DAVIN]      ED Course User Index  [DAVIN] Zelalem Lopez PA                                           Recent Results (from the past 24 hours)   Lipase    Collection Time: 04/14/25  5:00 PM    Specimen: Blood   Result Value  Ref Range    Lipase 26 13 - 60 U/L   Comprehensive Metabolic Panel    Collection Time: 04/14/25  5:00 PM    Specimen: Blood   Result Value Ref Range    Glucose 97 65 - 99 mg/dL    BUN 8 6 - 20 mg/dL    Creatinine 0.54 (L) 0.57 - 1.00 mg/dL    Sodium 135 (L) 136 - 145 mmol/L    Potassium 3.6 3.5 - 5.2 mmol/L    Chloride 102 98 - 107 mmol/L    CO2 21.0 (L) 22.0 - 29.0 mmol/L    Calcium 8.7 8.6 - 10.5 mg/dL    Total Protein 6.7 6.0 - 8.5 g/dL    Albumin 4.1 3.5 - 5.2 g/dL    ALT (SGPT) 15 1 - 33 U/L    AST (SGOT) 18 1 - 32 U/L    Alkaline Phosphatase 47 39 - 117 U/L    Total Bilirubin 0.5 0.0 - 1.2 mg/dL    Globulin 2.6 gm/dL    A/G Ratio 1.6 g/dL    BUN/Creatinine Ratio 14.8 7.0 - 25.0    Anion Gap 12.0 5.0 - 15.0 mmol/L    eGFR 131.2 >60.0 mL/min/1.73   hCG, Quantitative, Pregnancy    Collection Time: 04/14/25  5:00 PM    Specimen: Blood   Result Value Ref Range    HCG Quantitative 85,338.00 mIU/mL   Green Top (Gel)    Collection Time: 04/14/25  5:00 PM   Result Value Ref Range    Extra Tube Hold for add-ons.    Lavender Top    Collection Time: 04/14/25  5:00 PM   Result Value Ref Range    Extra Tube hold for add-on    Gold Top - SST    Collection Time: 04/14/25  5:00 PM   Result Value Ref Range    Extra Tube Hold for add-ons.    Gray Top    Collection Time: 04/14/25  5:00 PM   Result Value Ref Range    Extra Tube Hold for add-ons.    Light Blue Top    Collection Time: 04/14/25  5:00 PM   Result Value Ref Range    Extra Tube Hold for add-ons.    CBC Auto Differential    Collection Time: 04/14/25  5:00 PM    Specimen: Blood   Result Value Ref Range    WBC 8.20 3.40 - 10.80 10*3/mm3    RBC 3.96 3.77 - 5.28 10*6/mm3    Hemoglobin 13.1 12.0 - 15.9 g/dL    Hematocrit 37.2 34.0 - 46.6 %    MCV 93.9 79.0 - 97.0 fL    MCH 33.1 (H) 26.6 - 33.0 pg    MCHC 35.2 31.5 - 35.7 g/dL    RDW 11.7 (L) 12.3 - 15.4 %    RDW-SD 40.0 37.0 - 54.0 fl    MPV 11.2 6.0 - 12.0 fL    Platelets 234 140 - 450 10*3/mm3    Neutrophil % 71.4 42.7 -  76.0 %    Lymphocyte % 20.0 19.6 - 45.3 %    Monocyte % 6.3 5.0 - 12.0 %    Eosinophil % 1.1 0.3 - 6.2 %    Basophil % 1.0 0.0 - 1.5 %    Immature Grans % 0.2 0.0 - 0.5 %    Neutrophils, Absolute 5.85 1.70 - 7.00 10*3/mm3    Lymphocytes, Absolute 1.64 0.70 - 3.10 10*3/mm3    Monocytes, Absolute 0.52 0.10 - 0.90 10*3/mm3    Eosinophils, Absolute 0.09 0.00 - 0.40 10*3/mm3    Basophils, Absolute 0.08 0.00 - 0.20 10*3/mm3    Immature Grans, Absolute 0.02 0.00 - 0.05 10*3/mm3    nRBC 0.0 0.0 - 0.2 /100 WBC    RhIg Evaluation    Collection Time: 25  7:16 PM    Specimen: Blood   Result Value Ref Range    ABO Type O     RH type Positive    Doses of Rh Immune Globulin    Collection Time: 25  7:16 PM    Specimen: Blood   Result Value Ref Range    Number of Doses       RhIg is not indicated due to the patient's Rh status     Note: In addition to lab results from this visit, the labs listed above may include labs taken at another facility or during a different encounter within the last 24 hours. Please correlate lab times with ED admission and discharge times for further clarification of the services performed during this visit.    US Ob Transvaginal   Final Result   1.Evidence for an early viable intrauterine gestation demonstrating an estimated gestational age on this ultrasound of 9 weeks and 2 days. Fetal cardiac activity is noted with a fetal heart rate of 176 beats per minute.   2.Evidence for a subchorionic hematoma within the lower uterine segment measuring up to approximately 2.9 cm. Close clinical follow-up is recommended. Short-term follow-up imaging may be obtained as indicated.   3.Evidence for a corpus luteal cyst involving the left ovary.         Electronically Signed: Robert Christianson MD     2025 9:13 PM EDT     Workstation ID: [id number]      US Testicular or Ovarian Vascular Limited   Final Result   1.Evidence for an early viable intrauterine gestation demonstrating an estimated  "gestational age on this ultrasound of 9 weeks and 2 days. Fetal cardiac activity is noted with a fetal heart rate of 176 beats per minute.   2.Evidence for a subchorionic hematoma within the lower uterine segment measuring up to approximately 2.9 cm. Close clinical follow-up is recommended. Short-term follow-up imaging may be obtained as indicated.   3.Evidence for a corpus luteal cyst involving the left ovary.         Electronically Signed: Robert Christianson MD     2025 9:13 PM EDT     Workstation ID: [id number]      US Abdomen Limited   Final Result   Impression:   The appendix is not well visualized. If there is further clinical concern for appendicitis, MRI protocol for appendicitis may be conducted for further assessment.            Electronically Signed: Robert hCristianson MD     2025 8:57 PM EDT     Workstation ID: [id number]        Vitals:    25 1632   BP: 113/73   BP Location: Left arm   Patient Position: Sitting   Pulse: 80   Resp: 18   Temp: 98.3 °F (36.8 °C)   TempSrc: Oral   SpO2: 98%   Weight: 77.6 kg (171 lb)   Height: 170.2 cm (67\")     Medications   sodium chloride 0.9 % flush 10 mL (has no administration in time range)     ECG/EMG Results (last 24 hours)       ** No results found for the last 24 hours. **          No orders to display                     Medical Decision Making  Problems Addressed:  Right lower quadrant pain: complicated acute illness or injury  Subchorionic hematoma in first trimester, single or unspecified fetus: complicated acute illness or injury  Threatened : complicated acute illness or injury    Amount and/or Complexity of Data Reviewed  Labs: ordered.  Radiology: ordered.    Risk  Prescription drug management.        Final diagnoses:   Right lower quadrant pain   Subchorionic hematoma in first trimester, single or unspecified fetus   Threatened        ED Disposition  ED Disposition       ED Disposition   Discharge    Condition   Stable    Comment   -- "               No follow-up provider specified.       Medication List      No changes were made to your prescriptions during this visit.            Zelalem Lopez PA  04/15/25 [id number]

## 2025-04-15 NOTE — TELEPHONE ENCOUNTER
Patient of Dr. Negro;  @ 9w 3d. XAVI noted @ 6w 4d. LOV 25 for NOB visit; NOV 25.  See phone call note from yesterday.   Returned patient's call.   States RLQ pain was persistent throughout the day yesterday, worse by the evening.   She was seen in [facility] ER last night with c/o RLQ pain.   Ultrasound IMPRESSION:  1.Evidence for an early viable intrauterine gestation demonstrating an estimated gestational age on this ultrasound of 9 weeks and 2 days. Fetal cardiac activity is noted with a fetal heart rate of 176 beats per minute.  2.Evidence for a subchorionic hematoma within the lower uterine segment measuring up to approximately 2.9 cm. Close clinical follow-up is recommended. Short-term follow-up imaging may be obtained as indicated.  3.Evidence for a corpus luteal cyst involving the left ovary.   Denies any bleeding. Still having intermittent mild RLQ pain; less often than it was.   Discussed pelvic rest until next visit here.   Advised to keep scheduled appointment with ultrasound on 25; avoid strenuous activity; and call for worsening symptoms. Reviewed how to reach on-call provider after office hours. Patient v/u and agreed.   Patient asked if she should stop taking bASA since XAVI has increased in size. Informed her I will check with provider and call her back. She v/u and agreed.   Spoke with Dr. Negro's nurse, Herlinda. She will discuss with Dr. Negro and call patient back.

## 2025-04-24 ENCOUNTER — ROUTINE PRENATAL (OUTPATIENT)
Dept: OBSTETRICS AND GYNECOLOGY | Facility: CLINIC | Age: 25
End: 2025-04-24
Payer: COMMERCIAL

## 2025-04-24 VITALS — SYSTOLIC BLOOD PRESSURE: 118 MMHG | DIASTOLIC BLOOD PRESSURE: 72 MMHG | BODY MASS INDEX: 27.31 KG/M2 | WEIGHT: 174.4 LBS

## 2025-04-24 DIAGNOSIS — Z34.91 FIRST TRIMESTER PREGNANCY: Primary | ICD-10-CM

## 2025-04-24 NOTE — PROGRESS NOTES
OB FOLLOW UP  CC- Here for care of pregnancy        Evelyn Lawrence is a 24 y.o.  10w5d patient being seen today for her obstetrical follow up visit. Patient reports no complaints.     Her prenatal care is complicated by (and status) :   Patient Active Problem List   Diagnosis    Moderate episode of recurrent major depressive disorder    CAROLE (generalized anxiety disorder)       NOB labs reviewed  Ultrasound Today: Yes    ROS -   Patient Denies: leaking of fluid, vaginal bleeding, dysuria, excessive vomiting, and more than 6 contractions per hour  Fetal Movement: absent  Other than what is documented in the HPI, all other systems reviewed and are negative.     The additional following portions of the patient's history were reviewed and updated as appropriate: allergies and current medications.    I have reviewed and agree with the HPI, ROS, and historical information as entered above. Sushant Negro MD          /72   Wt 79.1 kg (174 lb 6.4 oz)   LMP 2025 (Exact Date)   BMI 27.31 kg/m²         EXAM:     Prenatal Vitals  BP: 118/72  Weight: 79.1 kg (174 lb 6.4 oz)   Fetal Heart Rate: 165                  Assessment and Plan    Problem List Items Addressed This Visit    None  Visit Diagnoses         First trimester pregnancy    -  Primary    Relevant Orders    POC Urinalysis Dipstick    [id number] PLUS Core+SCA+ESS - Blood,            Pregnancy at 10w5d  Labs reviewed from New OB Visit.  Counseled on genetic testing, carrier status and option for NT screen  Activity and Exercise discussed.  Patient is on Prenatal vitamins  Return in about 1 month (around 2025).    Sushant Negro MD  2025

## 2025-04-28 LAB
5P15 DELETION (CRI-DU-CHAT): NOT DETECTED
CFDNA.FET/CFDNA.TOTAL SFR FETUS: NORMAL %
CITATION REF LAB TEST: NORMAL
FET 13+18+21+X+Y ANEUP PLAS.CFDNA: NEGATIVE
FET 1P36 DEL RISK WBC.DNA+CFDNA QL: NOT DETECTED
FET 22Q11.2 DEL RISK WBC.DNA+CFDNA QL: NOT DETECTED
FET CHR 11Q23 DEL PLAS.CFDNA QL: NOT DETECTED
FET CHR 15Q11 DEL PLAS.CFDNA QL: NOT DETECTED
FET CHR 21 TS PLAS.CFDNA QL: NEGATIVE
FET CHR 4P16 DEL PLAS.CFDNA QL: NOT DETECTED
FET CHR 8Q24 DEL PLAS.CFDNA QL: NOT DETECTED
FET MS X RISK WBC.DNA+CFDNA QL: NOT DETECTED
FET SEX PLAS.CFDNA DOSAGE CFDNA: NORMAL
FET TS 13 RISK PLAS.CFDNA QL: NEGATIVE
FET TS 18 RISK WBC.DNA+CFDNA QL: NEGATIVE
FET X + Y ANEUP RISK PLAS.CFDNA SEQ-IMP: NOT DETECTED
GA EST FROM CONCEPTION DATE: NORMAL D
GESTATIONAL AGE > 9:: YES
LAB DIRECTOR NAME PROVIDER: NORMAL
LAB DIRECTOR NAME PROVIDER: NORMAL
LABORATORY COMMENT REPORT: NORMAL
LIMITATIONS OF THE TEST: NORMAL
NEGATIVE PREDICTIVE VALUE: NORMAL
PERFORMANCE CHARACTERISTICS: NORMAL
POSITIVE PREDICTIVE VALUE: NORMAL
REF LAB TEST METHOD: NORMAL
SERVICE CMNT-IMP: NORMAL
TEST PERFORMANCE INFO SPEC: NORMAL
TRIOSOMY 16: NOT DETECTED
TRISOMY 22: NOT DETECTED

## 2025-05-06 ENCOUNTER — TELEPHONE (OUTPATIENT)
Dept: OBSTETRICS AND GYNECOLOGY | Facility: CLINIC | Age: 25
End: 2025-05-06
Payer: COMMERCIAL

## 2025-05-06 NOTE — TELEPHONE ENCOUNTER
Patient of Dr. Negro;  @ 12w 3d. LOV 25; NOV 25.  Returned patient's call.   States she did her normal exercises, including squats, this morning.   Now she is having some aching discomfort across lower abdomen. States she thinks it might be because her pants are getting too tight on her abdomen.   Denies any bleeding or other problems.   Advised her to consider taking a break from exercise and gradually resume when discomfort has resolved; consider some transitional clothing as well. Patient v/u and agreed.

## 2025-05-06 NOTE — TELEPHONE ENCOUNTER
Provider: DR BARBOSA    Caller: Evelyn Lawrence    Relationship to Patient: Self    Phone Number: [phone] / LVM    Reason for Call: OB PT EXERCISED THIS MORNING (SQUATS LIKE ALWAYS) PT IS NOW HAVE SOME ACHES AND PAINS IN LOWER PELVIC AREA (NOTHING MAJOR) - NO BLEEDING     PT WOULD LIKE TO SPEAK WITH A NURSE    THANK YOU!

## 2025-05-09 ENCOUNTER — TELEPHONE (OUTPATIENT)
Dept: OBSTETRICS AND GYNECOLOGY | Facility: CLINIC | Age: 25
End: 2025-05-09
Payer: COMMERCIAL

## 2025-05-09 NOTE — TELEPHONE ENCOUNTER
Spoke with pt. She states she was sitting on bleachers at a graduation and a lady missed a step and sort of fell on her causing her to bend really far over to be able to get up. She denies and pain or bleeding. I let her know she should monitor symptoms and call back if they begin. She also states she just bought a fetal doppler. I let her know at this point in pregnancy it may be difficult to find the rate on her own but to try lower in the pelvic area. She VU

## 2025-05-09 NOTE — TELEPHONE ENCOUNTER
Caller: Evelyn Lawrence    Relationship: Self    Best call back number: [phone]      Who are you requesting to speak with (clinical staff, DR. BARBOSA        What was the call regarding: PATIENT ADVISED THAT SHE WAS AT A GRADUATION TODAY AND LADY FELL ON TOP OF HER ON HER BACK, PATIENT WAS IN A BENT OVER/FORWARD POSITION FOR A FEW SECONDS, WOULD LIKE TO KNOW IF SHE NEEDS TO BE SEEN.  PLEASE ADVISE.

## 2025-05-12 ENCOUNTER — TELEPHONE (OUTPATIENT)
Dept: OBSTETRICS AND GYNECOLOGY | Facility: CLINIC | Age: 25
End: 2025-05-12
Payer: COMMERCIAL

## 2025-05-12 NOTE — TELEPHONE ENCOUNTER
Patient of Dr. Negro;  @ 13w 2d. LOV 25; NOV 25.   Returned patient's call.   States a lady tripped and fell onto her 25, causing her to be bent over for a couple of seconds. She was told to call if she had any cramping. See phone encounter note.   Reports having mild-moderate lower abdominal cramping since last night; feels more discomfort in center and left lower abdomen. Patient asking if it could be round ligament pain.   Denies any bleeding, back pain, or urinary symptoms.   States she does have intermittent constipation. Discussed increased water and fiber intake; can take Colace or Miralax as needed.   MBT is O positive  Advised to hydrate well; take measures to avoid constipation; monitor and call if symptoms worsen or fail to improve.   Patient v/u and agreed.

## 2025-05-12 NOTE — TELEPHONE ENCOUNTER
Caller: Evelyn Lawrence    Relationship to patient: Self    Best call back number: [phone]      Patient is needing: IN RESPONSE TO TE FROM FRIDAY - PATIENT IS NOW EXPERIENCING CRAMPING THAT STARTED LAST NIGHT - LIGHT TO MODERATE CRAMPING.

## 2025-05-22 ENCOUNTER — ROUTINE PRENATAL (OUTPATIENT)
Dept: OBSTETRICS AND GYNECOLOGY | Facility: CLINIC | Age: 25
End: 2025-05-22
Payer: COMMERCIAL

## 2025-05-22 VITALS — DIASTOLIC BLOOD PRESSURE: 70 MMHG | BODY MASS INDEX: 28.19 KG/M2 | WEIGHT: 180 LBS | SYSTOLIC BLOOD PRESSURE: 112 MMHG

## 2025-05-22 DIAGNOSIS — Z34.82 PRENATAL CARE, SUBSEQUENT PREGNANCY, SECOND TRIMESTER: Primary | ICD-10-CM

## 2025-05-22 LAB
GLUCOSE UR STRIP-MCNC: NEGATIVE MG/DL
PROT UR STRIP-MCNC: NEGATIVE MG/DL

## 2025-05-22 NOTE — PROGRESS NOTES
OB FOLLOW UP  CC- Here for care of pregnancy        Evelyn Lawrence is a 24 y.o.  14w5d patient being seen today for her obstetrical follow up visit. Patient reports nausea with occasional vomiting-plans to start unisom and b6.      Her prenatal care is complicated by (and status) :   Patient Active Problem List   Diagnosis    Moderate episode of recurrent major depressive disorder    CAROLE (generalized anxiety disorder)       Genetic testing?: already completed and was normal.  NOB labs reviewed  Ultrasound Today: No    ROS -   Patient Denies: leaking of fluid and vaginal bleeding  Fetal Movement: absent  Other than what is documented in the HPI, all other systems reviewed and are negative.     The additional following portions of the patient's history were reviewed and updated as appropriate: allergies and current medications.    I have reviewed and agree with the HPI, ROS, and historical information as entered above. Sushant Negro MD          /70   Wt 81.6 kg (180 lb)   LMP 2025 (Exact Date)   BMI 28.19 kg/m²         EXAM:     Prenatal Vitals  BP: 112/70  Weight: 81.6 kg (180 lb)   Fetal Heart Rate: 156          Urine Glucose Read-only: Negative  Urine Protein Read-only: Negative       Assessment and Plan    Problem List Items Addressed This Visit    None  Visit Diagnoses         Prenatal care, subsequent pregnancy, second trimester    -  Primary    Relevant Orders    POC Urinalysis Dipstick (Completed)            Pregnancy at 14w5d  Labs reviewed from New OB Visit.  Counseled on genetic testing, carrier status and option for NT screen  Activity and Exercise discussed.  Patient is on Prenatal vitamins  No follow-ups on file.    Sushant Negro MD  2025     talk about plan of care with doctors

## 2025-05-27 ENCOUNTER — TELEPHONE (OUTPATIENT)
Dept: OBSTETRICS AND GYNECOLOGY | Facility: CLINIC | Age: 25
End: 2025-05-27
Payer: COMMERCIAL

## 2025-05-27 NOTE — TELEPHONE ENCOUNTER
Caller: Evelyn Lawrence    Relationship: Self    Best call back number: [phone]    What is the best time to reach you: ANY    Who are you requesting to speak with (clinical staff, provider,  specific staff member): CLINICAL       What was the call regarding: PT STATES SHE BELIEVES HER BLOOD SUGAR WAS LOW THIS MORNING. SENT [employer] MESS[employer] HOWEVER WAS WANTING TO DISCUSS ASAP. UNABLE TO WT

## 2025-06-10 ENCOUNTER — TELEPHONE (OUTPATIENT)
Dept: OBSTETRICS AND GYNECOLOGY | Facility: CLINIC | Age: 25
End: 2025-06-10
Payer: COMMERCIAL

## 2025-06-10 NOTE — TELEPHONE ENCOUNTER
17w3d. Private US Saturday. Amniotic fluid measured. Was told it was 3.7?? She is unsure what was measured and cannot get Us report. Denies cramping. Noticed a lot more discharge yesterday. Denies watery d/c. Reports it was just clear discharge. Changed underwear one time just because she was uncomfortable, panties were not saturated. Discharge has improved today. Denies vaginal bleeding and changes in fetal movement. Patient informed it does not sound like she is leaking fluid. Discharge can increase during pregnancy. Patient would like to schedule appt for fluid check. Offered appt today. Patient states she cannot come in today. She requested to come in tomorrow.  Appt scheduled.

## 2025-06-11 ENCOUNTER — ROUTINE PRENATAL (OUTPATIENT)
Dept: OBSTETRICS AND GYNECOLOGY | Facility: CLINIC | Age: 25
End: 2025-06-11
Payer: COMMERCIAL

## 2025-06-11 VITALS — BODY MASS INDEX: 29.63 KG/M2 | DIASTOLIC BLOOD PRESSURE: 60 MMHG | WEIGHT: 189.2 LBS | SYSTOLIC BLOOD PRESSURE: 96 MMHG

## 2025-06-11 DIAGNOSIS — Z34.82 MULTIGRAVIDA IN SECOND TRIMESTER: Primary | ICD-10-CM

## 2025-06-11 LAB
BILIRUB BLD-MCNC: NEGATIVE MG/DL
CLARITY, POC: CLEAR
COLOR UR: ABNORMAL
GLUCOSE UR STRIP-MCNC: NEGATIVE MG/DL
KETONES UR QL: NEGATIVE
LEUKOCYTE EST, POC: NEGATIVE
NITRITE UR-MCNC: NEGATIVE MG/ML
PH UR: 6 [PH] (ref 5–8)
PROT UR STRIP-MCNC: NEGATIVE MG/DL
RBC # UR STRIP: NEGATIVE /UL
SP GR UR: 1 (ref 1–1.03)
UROBILINOGEN UR QL: ABNORMAL

## 2025-06-11 NOTE — PROGRESS NOTES
CC- leaking of fluid/vaginal discharge       Evelyn Lawrence is a 24 y.o.  17w4d patient being seen today for possible leaking of fluid since , and round ligament pain. Patient states she has been having increased discharge, and she went to a private imaging facility over the weekend and was told that her amniotic fluid was low. She brought her report today and it states the single deepest pocket was 4.7cm. Pt googled it and thought this meant her fluid was low.     Her prenatal care is complicated by (and status) :    Patient Active Problem List   Diagnosis    Moderate episode of recurrent major depressive disorder    CAROLE (generalized anxiety disorder)         Ultrasound Today: No.    ROS -   Patient Reports : heavier clear discharge, which is more than normal, and is wondering if it is amniotic fluid  Patient Denies: Vaginal Spotting, Vision Changes, Headaches, Nausea , Vomiting , Contractions, Epigastric pain, and skin itching  Fetal Movement : absent  All other systems reviewed and are negative.       The additional following portions of the patient's history were reviewed and updated as appropriate: allergies, current medications, past family history, past medical history, past social history, past surgical history, and problem list.    I have reviewed and agree with the HPI, ROS, and historical information as entered above. Rosalva Leigh, APRN      BP 96/60   Wt 85.8 kg (189 lb 3.2 oz)   LMP 2025 (Exact Date)   BMI 29.63 kg/m²       EXAM:     Prenatal Vitals  BP: 96/60  Weight: 85.8 kg (189 lb 3.2 oz)   Fetal Heart Rate: 154           Sterile speculum exam done- no pooling or gushing with valsalva. Nitrazine negative.  Moderate amount of creamy white vaginal discharge.        Assessment and Plan    Problem List Items Addressed This Visit    None  Visit Diagnoses         Multigravida in second trimester    -  Primary    Relevant Orders    POC Urinalysis Dipstick (Completed)             Pregnancy at 17w4d  Fetal status reassuring.   Reassured pt of normal amniotic fluid level, single deepest pocket needs to be above 2cm, this is normal. Also discussed normal vaginal discharge during pregnancy. When she has pregnancy concerns, she does not need to go to a pregnancy spa to address them, as they are not a medical facility and cannot appropriately evaluate her.   No signs of ROM. Nitrazine negative. Fern negative. Pooling negative.   Return for regularly scheduled OB appointment.  RTC for worsening symptoms  Return for Next scheduled follow up, BRANDON Leigh, TONI  06/11/2025     A portion of the care provided today was to address possibel LOF, which is outside the scope of routine prenatal care.

## 2025-06-26 ENCOUNTER — ROUTINE PRENATAL (OUTPATIENT)
Dept: OBSTETRICS AND GYNECOLOGY | Facility: CLINIC | Age: 25
End: 2025-06-26
Payer: COMMERCIAL

## 2025-06-26 VITALS — SYSTOLIC BLOOD PRESSURE: 120 MMHG | DIASTOLIC BLOOD PRESSURE: 72 MMHG | WEIGHT: 190.8 LBS | BODY MASS INDEX: 29.88 KG/M2

## 2025-06-26 DIAGNOSIS — Z34.92 SECOND TRIMESTER PREGNANCY: Primary | ICD-10-CM

## 2025-06-26 LAB
GLUCOSE UR STRIP-MCNC: NEGATIVE MG/DL
PROT UR STRIP-MCNC: NEGATIVE MG/DL

## 2025-06-26 NOTE — PROGRESS NOTES
OB FOLLOW UP  CC- Here for care of pregnancy        Evelyn Lawrence is a 24 y.o.  19w5d patient being seen today for her obstetrical follow up visit. Patient reports no complaints.     Her prenatal care is complicated by (and status) : see below.  Patient Active Problem List   Diagnosis    Moderate episode of recurrent major depressive disorder    CAROLE (generalized anxiety disorder)       Ultrasound Today: Yes  AFP was declined.    ROS -     Patient Denies: leaking of fluid, vaginal bleeding, dysuria, excessive vomiting, and more than 6 contractions per hour  Fetal Movement : flutters  Other than what is documented in the HPI, all other systems reviewed and are negative.       The additional following portions of the patient's history were reviewed and updated as appropriate: allergies and current medications.      I have reviewed and agree with the HPI, ROS, and historical information as entered above. Sushant Negro MD      LMP 2025 (Exact Date)       EXAM:                            Assessment and Plan    Problem List Items Addressed This Visit    None      Pregnancy at 19w5d  Anatomy scan today is incomplete, follow up in 4 weeks for additional views. Anatomy that was visualized was within normal limits.  Fetal status reassuring.   Activity and Exercise discussed.  U/S ordered at follow up  Patient is on Prenatal vitamins  No follow-ups on file.      Radha Padilla RN  2025

## 2025-06-27 ENCOUNTER — TELEPHONE (OUTPATIENT)
Dept: OBSTETRICS AND GYNECOLOGY | Facility: CLINIC | Age: 25
End: 2025-06-27
Payer: COMMERCIAL

## 2025-06-27 ENCOUNTER — HOSPITAL ENCOUNTER (EMERGENCY)
Facility: HOSPITAL | Age: 25
Discharge: HOME | End: 2025-06-27
Attending: OBSTETRICS & GYNECOLOGY | Admitting: OBSTETRICS & GYNECOLOGY
Payer: COMMERCIAL

## 2025-06-27 VITALS
OXYGEN SATURATION: 96 % | DIASTOLIC BLOOD PRESSURE: 74 MMHG | HEART RATE: 74 BPM | SYSTOLIC BLOOD PRESSURE: 116 MMHG | TEMPERATURE: 98 F

## 2025-06-27 LAB
ALP SERPL-CCNC: 48 U/L (ref 39–117)
ALT SERPL W P-5'-P-CCNC: 30 U/L (ref 1–33)
AST SERPL-CCNC: 19 U/L (ref 1–32)
BILIRUB SERPL-MCNC: 0.3 MG/DL (ref 0–1.2)
BILIRUB UR QL STRIP: NEGATIVE
CLARITY UR: CLEAR
COLOR UR: YELLOW
CREAT SERPL-MCNC: 0.42 MG/DL (ref 0.57–1)
DEPRECATED RDW RBC AUTO: 43 FL (ref 37–54)
ERYTHROCYTE [DISTWIDTH] IN BLOOD BY AUTOMATED COUNT: 12.2 % (ref 12.3–15.4)
GLUCOSE UR STRIP-MCNC: NEGATIVE MG/DL
HCT VFR BLD AUTO: 32.7 % (ref 34–46.6)
HGB BLD-MCNC: 11.1 G/DL (ref 12–15.9)
HGB UR QL STRIP.AUTO: NEGATIVE
KETONES UR QL STRIP: NEGATIVE
LDH SERPL-CCNC: 127 U/L (ref 135–214)
LEUKOCYTE ESTERASE UR QL STRIP.AUTO: NEGATIVE
MCH RBC QN AUTO: 33 PG (ref 26.6–33)
MCHC RBC AUTO-ENTMCNC: 33.9 G/DL (ref 31.5–35.7)
MCV RBC AUTO: 97.3 FL (ref 79–97)
NITRITE UR QL STRIP: NEGATIVE
PH UR STRIP.AUTO: 5.5 [PH] (ref 5–8)
PLATELET # BLD AUTO: 174 10*3/MM3 (ref 140–450)
PMV BLD AUTO: 11.8 FL (ref 6–12)
PROT UR QL STRIP: NEGATIVE
RBC # BLD AUTO: 3.36 10*6/MM3 (ref 3.77–5.28)
SP GR UR STRIP: 1.01 (ref 1–1.03)
URATE SERPL-MCNC: 3.5 MG/DL (ref 2.4–5.7)
UROBILINOGEN UR QL STRIP: NORMAL
WBC NRBC COR # BLD AUTO: 8.17 10*3/MM3 (ref 3.4–10.8)

## 2025-06-27 PROCEDURE — 82565 ASSAY OF CREATININE: CPT | Performed by: OBSTETRICS & GYNECOLOGY

## 2025-06-27 PROCEDURE — 83615 LACTATE (LD) (LDH) ENZYME: CPT | Performed by: OBSTETRICS & GYNECOLOGY

## 2025-06-27 PROCEDURE — 59025 FETAL NON-STRESS TEST: CPT

## 2025-06-27 PROCEDURE — 84550 ASSAY OF BLOOD/URIC ACID: CPT | Performed by: OBSTETRICS & GYNECOLOGY

## 2025-06-27 PROCEDURE — 84450 TRANSFERASE (AST) (SGOT): CPT | Performed by: OBSTETRICS & GYNECOLOGY

## 2025-06-27 PROCEDURE — 82247 BILIRUBIN TOTAL: CPT | Performed by: OBSTETRICS & GYNECOLOGY

## 2025-06-27 PROCEDURE — 84460 ALANINE AMINO (ALT) (SGPT): CPT | Performed by: OBSTETRICS & GYNECOLOGY

## 2025-06-27 PROCEDURE — 85027 COMPLETE CBC AUTOMATED: CPT | Performed by: OBSTETRICS & GYNECOLOGY

## 2025-06-27 PROCEDURE — 84075 ASSAY ALKALINE PHOSPHATASE: CPT | Performed by: OBSTETRICS & GYNECOLOGY

## 2025-06-27 PROCEDURE — 81003 URINALYSIS AUTO W/O SCOPE: CPT | Performed by: OBSTETRICS & GYNECOLOGY

## 2025-06-27 PROCEDURE — 99283 EMERGENCY DEPT VISIT LOW MDM: CPT | Performed by: OBSTETRICS & GYNECOLOGY

## 2025-06-27 RX ORDER — BUTALBITAL, ACETAMINOPHEN AND CAFFEINE 50; 325; 40 MG/1; MG/1; MG/1
1 TABLET ORAL ONCE
Status: COMPLETED | OUTPATIENT
Start: 2025-06-27 | End: 2025-06-27

## 2025-06-27 RX ADMIN — BUTALBITAL, ACETAMINOPHEN, AND CAFFEINE 1 TABLET: 325; 50; 40 TABLET ORAL at 21:18

## 2025-06-27 RX ADMIN — BUTALBITAL, ACETAMINOPHEN AND CAFFEINE 1 TABLET: 50; 325; 40 TABLET ORAL at 22:35

## 2025-06-27 NOTE — TELEPHONE ENCOUNTER
OP OB patient  19w6d    Patient reports severe headache that began 30 minutes ago accompanied by numbness and tingling in her right hand. Patient reports taking Tylenol 500MG with onset. Per OP- go home, take 600MG of Ibuprofen, Benadryl 50MG, warm bath/shower, and go to sleep. OP recommended taking no more than 2 doses of Ibuprofen. She v/u. Advised to report to L&D for continued or worsening s/s.

## 2025-06-27 NOTE — TELEPHONE ENCOUNTER
Pt recently talked to joe and would like to know that the symptoms and the medication she is taking will hurt the baby

## 2025-06-27 NOTE — TELEPHONE ENCOUNTER
Patient states she can only find allergy benadryl and was notified no more than 3 doses of ibuprofen and can only find 200 mg tablets and wondering if she can take 3 tablets which would equal 600 wondering if this is ok

## 2025-06-27 NOTE — TELEPHONE ENCOUNTER
PT is calling in at 19 weeks and 6 days with a  headache that started about 20 mins ago - PT did take 500mg of tylenol with out any relief yet - PT also states her right hand in tingly and numb - denies blurry vision. PT has not take her BP. PT has been drinking water, would like a call back to discuss.

## 2025-06-28 NOTE — OBED NOTES
[facility]  Obstetric History and Physical    Referring Provider: Sushant Negro MD      Chief Complaint   Patient presents with    Headache       Subjective     Patient is a 24 y.o. female  currently at 19w6d, who presents with severe headache 8/10 since 11 am in her forehead and top of her head, pt had some relief with advil and benadryl at noon, she last ate 2 hours ago with no n/v and no fever, pt has no neck pain with movement unless she tilts her chin up.  Pt has no blurred vision, pain is constant.  Pt has a h/o migraines pre pregnancy and had headaches in first trimester but this one is the worst she has had..    Uncomplicated PNC.  G1     The following portions of the patients history were reviewed and updated as appropriate: current medications, allergies, past medical history, past surgical history, past family history, past social history, and problem list .       Prenatal Information:   Maternal Prenatal Labs          External Prenatal Results       Pregnancy Outside Results - Transcribed From Office Records - See Scanned Records For Details       Test Value Date Time    ABO  O  25    Rh  Positive  25    Antibody Screen  Negative  25 1313    Varicella IgG       Rubella  3.68 index 25 1313    Hgb  13.1 g/dL 25 1700       13.1 g/dL 25 1313    Hct  37.2 % 25 1700       39.6 % 25 1313    HgB A1c        1h GTT       3h GTT Fasting       3h GTT 1 hour       3h GTT 2 hour       3h GTT 3 hour        Gonorrhea (discrete)  Negative  25 1313    Chlamydia (discrete)  Negative  25 1313    RPR  Non Reactive  25 1313    Syphils cascade: TP-Ab (FTA)       TP-Ab       TP-Ab (EIA)       TPPA       HBsAg  Negative  25 1313    Herpes Simplex Virus PCR       Herpes Simplex VIrus Culture       HIV  Non Reactive  25 1313    Hep C RNA Quant PCR       Hep C Antibody  Non Reactive  25 1313    AFP       NIPT       Cystic  Fibrosis ([place])       Cystic Fibroisis        Spinal Muscular atrophy       Fragile X       Group B Strep       GBS Susceptibility to Clindamycin       GBS Susceptibility to Erythromycin       Fetal Fibronectin       Genetic Testing, Maternal Blood                 Drug Screening       Test Value Date Time    Urine Drug Screen       Amphetamine Screen  Negative ng/mL 25 1313    Barbiturate Screen  Negative ng/mL 25 1313    Benzodiazepine Screen  Negative ng/mL 25 1313    Methadone Screen  Negative ng/mL 25 1313    Phencyclidine Screen  Negative ng/mL 25 1313    Opiates Screen       THC Screen       Cocaine Screen       Propoxyphene Screen  Negative ng/mL 25 1313    Buprenorphine Screen       Methamphetamine Screen       Oxycodone Screen       Tricyclic Antidepressants Screen                 Legend    ^: Historical                              Past OB History:       OB History    Para Term  AB Living   2 0 0 0 1 0   SAB IAB Ectopic Molar Multiple Live Births   0 0 1 0 0 0      # Outcome Date GA Lbr Reid/2nd Weight Sex Type Anes PTL Lv   2 Current            1 Ectopic 2023              Obstetric Comments   2023- ectopic pregnancy- LT salpingectomy       Past Medical History: Past Medical History:   Diagnosis Date    Anxiety     Depression 2022    Ectopic pregnancy 2023    Ovarian cyst     I’ve had them in the past. Last in     Scoliosis     Seizures     Temporal partial     Urinary tract infection     I’ve had them in the past    Varicella     I was in elementary school      Past Surgical History Past Surgical History:   Procedure Laterality Date    SALPINGECTOMY Left     2023    WISDOM TOOTH EXTRACTION        Family History: Family History   Problem Relation Age of Onset    Heart attack Father 47    Seizures Mother     Other (lupus) Mother     Lupus Mother     Depression Mother     Mental illness Mother         Bipolar    Depression Sister      Mental illness Sister         Bipolar    COPD Paternal Grandfather     Diabetes Paternal Grandfather     Emphysema Paternal Grandfather     Hyperlipidemia Maternal Grandmother     Diabetes Maternal Grandmother     Breast cancer Neg Hx     Ovarian cancer Neg Hx     Uterine cancer Neg Hx     Colon cancer Neg Hx       Social History:  reports that she has never smoked. She has never used smokeless tobacco.   reports that she does not currently use alcohol.   reports no history of drug use.                   General ROS Negative Findings:Headaches    ROS     All other systems have been reviewed and are neg  Objective       Vital Signs Range for the last 24 hours  Temperature: Temp:  [98 °F (36.7 °C)] 98 °F (36.7 °C)   Temp Source: Temp src: Oral   BP: BP: (116)/(74) 116/74   Pulse: Heart Rate:  [74-88] 74   Respirations:     SPO2: SpO2:  [96 %] 96 %   O2 Amount (l/min):     O2 Devices     Weight:       Physical Examination:   General:   alert, appears stated age, cooperative, and no distress   Skin:   normal and no rash or abnormalities   HEENT:     Lungs:   clear to auscultation bilaterally   Heart:   regular rate and rhythm, S1, S2 normal, no murmur, click, rub or gallop   Gastrointestinal:  soft, non-tender; bowel sounds normal; no masses,  no organomegaly   Lower Extremities:    : Exam deferred.   Musculoskeletal:     Neuro:           Presentation:      Cervix: Exam by:  deferred   Dilation:     Effacement:     Station:         Fetal Heart Rate Assessment   Method:     Beats/min: Fetal HR (beats/min): 155   Baseline: Fetal HR Baseline: normal range   Varibility: Fetal HR Variability: minimal (detectable, amplitude less than or equal to 5 bpm)   Accels: Fetal HR Accelerations: greater than/equal to 10 bpm (32 wks gest or less), lasts at least 10 seconds (32 wks gest or less)   Decels: Fetal HR Decelerations: absent   Tracing Category:       Uterine Assessment   Method:     Frequency (min):     Ctx Count in 10  min:     Duration:     Intensity: Contraction Intensity: no contractions   Intensity by IUPC:     Resting Tone: Uterine Resting Tone: soft by palpation   Resting Tone by IUPC:     [place]oul Units:       Laboratory Results:   Lab Results (last 24 hours)       Procedure Component Value Units Date/Time    CBC (No Diff) [[id number]]  (Abnormal) Collected: 06/27/25 2113    Specimen: Blood Updated: 06/27/25 2146     WBC 8.17 10*3/mm3      RBC 3.36 10*6/mm3      Hemoglobin 11.1 g/dL      Hematocrit 32.7 %      MCV 97.3 fL      MCH 33.0 pg      MCHC 33.9 g/dL      RDW 12.2 %      RDW-SD 43.0 fl      MPV 11.8 fL      Platelets 174 10*3/mm3     Preeclampsia Panel [[id number]]  (Abnormal) Collected: 06/27/25 2113    Specimen: Blood Updated: 06/27/25 2139     Alkaline Phosphatase 48 U/L      ALT (SGPT) 30 U/L      AST (SGOT) 19 U/L      Creatinine 0.42 mg/dL      Total Bilirubin 0.3 mg/dL       U/L      Uric Acid 3.5 mg/dL     Urinalysis With Microscopic If Indicated (No Culture) - Urine, Clean Catch [[id number]]  (Normal) Collected: 06/27/25 2047    Specimen: Urine, Clean Catch Updated: 06/27/25 2101     Color, UA Yellow     Appearance, UA Clear     pH, UA 5.5     Specific Gravity, UA 1.008     Glucose, UA Negative     Ketones, UA Negative     Bilirubin, UA Negative     Blood, UA Negative     Protein, UA Negative     Leuk Esterase, UA Negative     Nitrite, UA Negative     Urobilinogen, UA 1.0 E.U./dL    Narrative:      Urine microscopic not indicated.          Radiology Review:   Imaging Results (Last 24 Hours)       ** No results found for the last 24 hours. **          Other Studies:    Assessment & Plan     Headache        PAUL Course / Assessment:  1.   All lab and imaging results listed above have been reviewed by me.  2.   Intrauterine pregnancy at 19w6d gestation with reassuring fetal status.    3.   4.      Plan:  Discharge to home.  , Follow-up with attending Obstetrician.    2.   RTC if headache worsens.  3.    Plan of care has been reviewed with patient.  4.   Risks, benefits of treatment plan have been discussed.  5.   All questions have been answered.  6.           Mechelle Massey MD  6/27/2025  22:28 EDT

## 2025-07-03 ENCOUNTER — TELEPHONE (OUTPATIENT)
Dept: OBSTETRICS AND GYNECOLOGY | Facility: CLINIC | Age: 25
End: 2025-07-03
Payer: COMMERCIAL

## 2025-07-03 NOTE — TELEPHONE ENCOUNTER
Pt called and stated on Monday she also has some lower abdominal pain and was wondering if this was of any concern as well

## 2025-07-03 NOTE — TELEPHONE ENCOUNTER
Confirmed patient message. Reports swelling did decrease after elevation last night (see PAR thread from yesterday). R>L +1 edema at the ankle; denies headaches or RUQ pain. Reviewed s/s preeclampsia and reinforced that 1 symptom alone isn't cause for worry, but multiple symptoms (swelling, unresolved HA, vision changes, RUQ pain, elevated B/P) are discussion and assessment worthy. Provided reassurance on level of edema and encouraged elevation of extremities and compression socks. Verbalized understanding.

## 2025-07-03 NOTE — TELEPHONE ENCOUNTER
Confirmed message received. Patient reports lower abdominal pain started at LLQ and gravitated to the middle, described as tightness and pulling. Bowel movement next day relieved it. Provided reassurance and round ligament pain guidance. Verbalized understanding. B/P this AM reported as 118/80s.

## 2025-07-14 ENCOUNTER — ROUTINE PRENATAL (OUTPATIENT)
Dept: OBSTETRICS AND GYNECOLOGY | Facility: CLINIC | Age: 25
End: 2025-07-14
Payer: COMMERCIAL

## 2025-07-14 ENCOUNTER — TELEPHONE (OUTPATIENT)
Dept: OBSTETRICS AND GYNECOLOGY | Facility: CLINIC | Age: 25
End: 2025-07-14
Payer: COMMERCIAL

## 2025-07-14 VITALS — SYSTOLIC BLOOD PRESSURE: 114 MMHG | WEIGHT: 194.2 LBS | DIASTOLIC BLOOD PRESSURE: 70 MMHG | BODY MASS INDEX: 30.42 KG/M2

## 2025-07-14 DIAGNOSIS — O26.892 VAGINAL DISCHARGE DURING PREGNANCY IN SECOND TRIMESTER: ICD-10-CM

## 2025-07-14 DIAGNOSIS — N89.8 VAGINAL DISCHARGE DURING PREGNANCY IN SECOND TRIMESTER: ICD-10-CM

## 2025-07-14 DIAGNOSIS — Z34.90 PRENATAL CARE, ANTEPARTUM, UNSPECIFIED GRAVIDITY: Primary | ICD-10-CM

## 2025-07-14 DIAGNOSIS — O26.852 SPOTTING AFFECTING PREGNANCY IN SECOND TRIMESTER: Primary | ICD-10-CM

## 2025-07-14 DIAGNOSIS — O43.199 MARGINAL INSERTION OF UMBILICAL CORD AFFECTING MANAGEMENT OF MOTHER: ICD-10-CM

## 2025-07-14 DIAGNOSIS — O43.119 ANTEPARTUM PLACENTA CIRCUMVALLATA: ICD-10-CM

## 2025-07-14 DIAGNOSIS — O46.90 VAGINAL BLEEDING DURING PREGNANCY: ICD-10-CM

## 2025-07-14 DIAGNOSIS — Z36.2 ENCOUNTER FOR FOLLOW-UP ULTRASOUND OF FETAL ANATOMY: ICD-10-CM

## 2025-07-14 LAB
BILIRUB BLD-MCNC: NEGATIVE MG/DL
GLUCOSE UR STRIP-MCNC: NEGATIVE MG/DL
KETONES UR QL: NEGATIVE
LEUKOCYTE EST, POC: NEGATIVE
NITRITE UR-MCNC: NEGATIVE MG/ML
PH UR: 6 [PH] (ref 5–8)
PROT UR STRIP-MCNC: NEGATIVE MG/DL
RBC # UR STRIP: NEGATIVE /UL
SP GR UR: 1 (ref 1–1.03)
UROBILINOGEN UR QL: NORMAL

## 2025-07-14 NOTE — TELEPHONE ENCOUNTER
Patient of Dr. Negro;  @ 22w 2d. Had XAVI in 1st trimester. LOV 25; NOV 25.  Returned patient's call.   See patient messages from today.   States she was seen in ER in [facility] 24 with spotting after intercourse; reports evaluation was WNL.   States she had cramping yesterday that was strong enough that she thought she might be miscarrying but it was relieved with Tylenol; she continued to have some light red spotting.  Today having red bleeding only when she wipes. Denies any pain or cramping today.   Reports clitoral swelling and tenderness since Friday.   States she is arriving on campus now. Advised to come to the office. Patient v/u and agreed.

## 2025-07-14 NOTE — TELEPHONE ENCOUNTER
Hub staff attempted to follow warm transfer process and was unsuccessful     Caller: Evelyn Lawrence    Relationship to patient: Self    Best call back number: [phone]    Patient is needing: PATIENT CALLING BACK TO INFORM DR. BARBOSA THAT SHE IS GOING TO GO GET TRIAGED AT THE HOSPITAL AT LABOR AND DELIVERY.

## 2025-07-14 NOTE — TELEPHONE ENCOUNTER
Pt called and stated that she is 22 weeks and has been having some spotting. Pt states she is not currently spotting, but did have some last night. Pt also sent in a message over her [employer] as well

## 2025-07-14 NOTE — PROGRESS NOTES
CC- vaginal bleeding work in OB       Evelynnatalie Lawrence is a 24 y.o.  22w2d patient being seen today for vaginal bleeding.She states that she had cramping 24. She was seen @ [place] 25 for spotting and cramping after intercourse. Reports pelvic and vaginal pressure 25.The spotting stopped the night of 25.  Reports severe cramping with light spotting yesterday. Reports that the cramping was relieved with Tylenol, but she continues to have spotting. The spotting is only with wiping today. Denies cramping today. Reports clitoral  tenderness since the episode Friday. Denies abnormal discharge but she reports that there is a smell of dried blood.     Her prenatal care is complicated by (and status) :    Patient Active Problem List   Diagnosis    Moderate episode of recurrent major depressive disorder    CAROLE (generalized anxiety disorder)    Marginal insertion of umbilical cord affecting management of mother    Antepartum placenta circumvallata         Ultrasound Today: Yes.  Findings showed . Hypoechoic area extending from basal plate to chorionic plate 26 x 11 x 17mm. Hypoechoic area noted at the inferior edge of placenta 35 x 11 x 20mm, cervical length 42.3mm, 3 vessel cord Pending physician review TONI Cordoba      ROS -   Patient Reports : see above  Patient Denies: Loss of Fluid, Vision Changes, Epigastric pain, and skin itching  Fetal Movement : normal  All other systems reviewed and are negative.       The additional following portions of the patient's history were reviewed and updated as appropriate: allergies, current medications, past family history, past medical history, past social history, past surgical history, and problem list.    I have reviewed and agree with the HPI, ROS, and historical information as entered above. TONI Cordoba      /70   Wt 88.1 kg (194 lb 3.2 oz)   LMP 2025 (Exact Date)   BMI 30.42 kg/m²       EXAM:      Prenatal Vitals  BP: 114/70  Weight: 88.1 kg (194 lb 3.2 oz)   Fetal Heart Rate: 160   Dilation/Effacement/Station  Dilation: Closed     Physical Exam  Constitutional:       Appearance: Normal appearance.   Genitourinary:      Rectum normal.      Genitourinary Comments: White mucus discharge moderate amt. No bleeding noted. Closed cervix.       No vaginal erythema or bleeding.      Cervical discharge present.      Uterus is enlarged.   Pulmonary:      Effort: Pulmonary effort is normal.   Neurological:      Mental Status: She is alert and oriented to person, place, and time.   Psychiatric:         Mood and Affect: Mood normal.         Behavior: Behavior normal.         Thought Content: Thought content normal.         Judgment: Judgment normal.   Vitals and nursing note reviewed. Exam conducted with a chaperone present.             Assessment and Plan    Problem List Items Addressed This Visit          Gravid and     Marginal insertion of umbilical cord affecting management of mother    Relevant Orders    [facility]  [facility]    Antepartum placenta circumvallata    Overview   25 (vaginal bleeding over the previous weekend)Hypoechoic area extending from basal plate to chorionic plate 26 x 11 x 17mm. Hypoechoic area noted at the inferior edge of placenta 35 x 11 x 20mm, cervical length 42.3mm, 3 vessel cord--PDC referral, pelvic rest.          Relevant Orders    [facility]  [facility]     Other Visit Diagnoses         Prenatal care, antepartum, unspecified     -  Primary    Relevant Orders    POC Urinalysis Dipstick (Completed)      Vaginal bleeding during pregnancy        Relevant Orders    Urine Culture - Urine, Urine, Clean Catch    [facility]    NuSwab VG+ - Swab, Cervix      Encounter for follow-up ultrasound of fetal anatomy        Relevant Orders    [facility]      Vaginal discharge during pregnancy in second  trimester        Relevant Orders    NuSwab VG+ - Swab, Cervix            Pregnancy at [id number] in ob for vaginal spotting over the weekend/cramping  Fetal status reassuring. Ultrasound showed: Hypoechoic area extending from basal plate to chorionic plate 26 x 11 x 17mm. Hypoechoic area noted at the inferior edge of placenta 35 x 11 x 20mm, cervical length 42.3mm, 3 vessel cord  US done today. Viable IUP  Advised pelvic rest, avoid heavy lifting, bleeding precautions reviewed.  Labor precautions reviewed.  CCUA negative, will send for culture. Will notify patient if results are positive for abx.   Increase PO fluids  RTC for worsening symptoms  Will send to PDC for further evaluation (discussed with TC)    A portion of the care provided today was to address vaginal bleeding/cramping, which is outside the scope of routine prenatal care.     Mary Sommers, APRN  07/14/2025

## 2025-07-15 ENCOUNTER — TELEPHONE (OUTPATIENT)
Dept: OBSTETRICS AND GYNECOLOGY | Facility: CLINIC | Age: 25
End: 2025-07-15
Payer: COMMERCIAL

## 2025-07-15 ENCOUNTER — HOSPITAL ENCOUNTER (EMERGENCY)
Facility: HOSPITAL | Age: 25
Discharge: HOME | End: 2025-07-15
Attending: OBSTETRICS & GYNECOLOGY | Admitting: OBSTETRICS & GYNECOLOGY
Payer: COMMERCIAL

## 2025-07-15 VITALS
TEMPERATURE: 97.7 F | OXYGEN SATURATION: 97 % | BODY MASS INDEX: 30.45 KG/M2 | SYSTOLIC BLOOD PRESSURE: 115 MMHG | HEIGHT: 67 IN | WEIGHT: 194 LBS | DIASTOLIC BLOOD PRESSURE: 71 MMHG | HEART RATE: 97 BPM | RESPIRATION RATE: 18 BRPM

## 2025-07-15 PROCEDURE — 99281 EMR DPT VST MAYX REQ PHY/QHP: CPT | Performed by: OBSTETRICS & GYNECOLOGY

## 2025-07-15 NOTE — OBED NOTES
"[facility]  Obstetric History and Physical    Referring Provider: Sushant Negro MD      Chief Complaint   Patient presents with    Vaginal Bleeding    Abdominal Cramping       Subjective     Patient is a 24 y.o. female  currently at 22w3d, who presents with spotting on her underwear today with some brb after wiping and in the toilet.  Had an ultrasound yesterday at [facility]'s office which was reviewed by Dr. Cerrato and shows a normal cervical length with a subchorionic hemorrhage..     Her previous obstetric/gynecological history is remarkable for ectopic pregnancy.    The following portions of the patients history were reviewed and updated as appropriate: current medications, allergies, past medical history, past surgical history, past family history, past social history, and problem list .       Prenatal Information:   Maternal Prenatal Labs  Blood Type No results found for: \"ABO\"   Rh Status No results found for: \"RH\"   Antibody Screen No results found for: \"ABSCRN\"   Gonnorhea No results found for: \"GCCX\"   Chlamydia No results found for: \"CLAMYDCU\"   RPR No results found for: \"RPR\"   Syphilis Antibody No results found for: \"SYPHILIS\"   Rubella No results found for: \"RUBELLAIGGIN\"   Hepatitis B Surface Antigen No results found for: \"HEPBSAG\"   HIV-1 Antibody No results found for: \"LABHIV1\"   Hepatitis C Antibody No results found for: \"HEPCAB\"   Rapid Urin Drug Screen No results found for: \"AMPMETHU\", \"BARBITSCNUR\", \"LABBENZSCN\", \"LABMETHSCN\", \"LABOPIASCN\", \"THCURSCR\", \"COCAINEUR\", \"AMPHETSCREEN\", \"PROPOXSCN\", \"BUPRENORSCNU\", \"METAMPSCNUR\", \"OXYCODONESCN\", \"TRICYCLICSCN\"   Group B Strep Culture No results found for: \"GBSANTIGEN\"           External Prenatal Results       Pregnancy Outside Results - Transcribed From Office Records - See Scanned Records For Details       Test Value Date Time    ABO  O  25    Rh  Positive  25    Antibody Screen  Negative  25 1313    Varicella IgG    "    Rubella  3.68 index 25 1313    Hgb  11.1 g/dL 25       13.1 g/dL 25 1700       13.1 g/dL 25 1313    Hct  32.7 % 25       37.2 % 25 1700       39.6 % 25 1313    HgB A1c        1h GTT       3h GTT Fasting       3h GTT 1 hour       3h GTT 2 hour       3h GTT 3 hour        Gonorrhea (discrete)  Negative  25 1313    Chlamydia (discrete)  Negative  25 1313    RPR  Non Reactive  25 1313    Syphils cascade: TP-Ab (FTA)       TP-Ab       TP-Ab (EIA)       TPPA       HBsAg  Negative  25 1313    Herpes Simplex Virus PCR       Herpes Simplex VIrus Culture       HIV  Non Reactive  25 1313    Hep C RNA Quant PCR       Hep C Antibody  Non Reactive  25 1313    AFP       NIPT       Cystic Fibrosis ([place]y)       Cystic Fibroisis        Spinal Muscular atrophy       Fragile X       Group B Strep       GBS Susceptibility to Clindamycin       GBS Susceptibility to Erythromycin       Fetal Fibronectin       Genetic Testing, Maternal Blood                 Drug Screening       Test Value Date Time    Urine Drug Screen       Amphetamine Screen  Negative ng/mL 25 1313    Barbiturate Screen  Negative ng/mL 25 1313    Benzodiazepine Screen  Negative ng/mL 25 1313    Methadone Screen  Negative ng/mL 25 1313    Phencyclidine Screen  Negative ng/mL 25 1313    Opiates Screen       THC Screen       Cocaine Screen       Propoxyphene Screen  Negative ng/mL 25 1313    Buprenorphine Screen       Methamphetamine Screen       Oxycodone Screen       Tricyclic Antidepressants Screen                 Legend    ^: Historical                              Past OB History:       OB History    Para Term  AB Living   2 0 0 0 1 0   SAB IAB Ectopic Molar Multiple Live Births   0 0 1 0 0 0      # Outcome Date GA Lbr Reid/2nd Weight Sex Type Anes PTL Lv   2 Current            1 Ectopic 2023              Obstetric Comments    8/2023- ectopic pregnancy- LT salpingectomy       Past Medical History: Past Medical History:   Diagnosis Date    Anxiety     Depression February 2022    Ectopic pregnancy 08/28/2023    Ovarian cyst     I’ve had them in the past. Last in 2019    Scoliosis     Seizures     Temporal partial     Urinary tract infection     I’ve had them in the past    Varicella     I was in elementary school      Past Surgical History Past Surgical History:   Procedure Laterality Date    SALPINGECTOMY Left     8/2023    WISDOM TOOTH EXTRACTION        Family History: Family History   Problem Relation Age of Onset    Heart attack Father 47    Seizures Mother     Other (lupus) Mother     Lupus Mother     Depression Mother     Mental illness Mother         Bipolar    Depression Sister     Mental illness Sister         Bipolar    COPD Paternal Grandfather     Diabetes Paternal Grandfather     Emphysema Paternal Grandfather     Hyperlipidemia Maternal Grandmother     Diabetes Maternal Grandmother     Breast cancer Neg Hx     Ovarian cancer Neg Hx     Uterine cancer Neg Hx     Colon cancer Neg Hx       Social History:  reports that she has never smoked. She has never used smokeless tobacco.   reports that she does not currently use alcohol.   reports no history of drug use.                   General ROS Negative Findings:Headaches, Visual Changes, Epigastric pain, Anorexia, Nausia/Vomiting, ROM, and Vaginal Bleeding    ROS     All other systems have been reviewed and are neg  Objective       Vital Signs Range for the last 24 hours  Temperature: Temp:  [97.7 °F (36.5 °C)] 97.7 °F (36.5 °C)   Temp Source: Temp src: Oral   BP: BP: (115)/(71) 115/71   Pulse: Heart Rate:  [97] 97   Respirations: Resp:  [18] 18   SPO2: SpO2:  [97 %] 97 %   O2 Amount (l/min):     O2 Devices     Weight: Weight:  [88 kg (194 lb)] 88 kg (194 lb)     Physical Examination:   General:   alert, appears stated age, and cooperative   Skin:   normal   HEENT:     Lungs:    Unlabored breathing   Heart:   regular rate and rhythm   Gastrointestinal:  soft, non-tender; bowel sounds normal; no masses,  no organomegaly   Lower Extremities:    : External genitalia: normal general appearance   Musculoskeletal:     Neuro:           Cervix: Exam by:     Dilation:  Visually closed and thick  No blood in vault, normal vaginal discharge   Effacement:     Station:         Fetal Heart Rate Assessment   Method: Fetal HR Assessment Method: intermittent auscultation, using Doppler   Beats/min: Fetal HR (beats/min): 145   Baseline: Fetal HR Baseline: normal range   Varibility:     Accels:     Decels:     Tracing Category:       Uterine Assessment   Method:     Frequency (min):     Ctx Count in 10 min:     Duration:     Intensity: Contraction Intensity: no contractions   Intensity by IUPC:     Resting Tone: Uterine Resting Tone: soft by palpation   Resting Tone by IUPC:     [place] Units:       Laboratory Results:   Lab Results (last 24 hours)       ** No results found for the last 24 hours. **          Radiology Review:   Imaging Results (Last 24 Hours)       ** No results found for the last 24 hours. **          Other Studies:    Assessment & Plan     Vaginal spotting with normal cervical length and evidence of subchorionic hemorrhage        PAUL Course / Assessment:  1.   All lab and imaging results listed above have been reviewed by me.  2.   Intrauterine pregnancy at 22w3d gestation with reassuring fetal status.           Plan:  Has appointment for consult with PDC tomorrow - d/w  Cerrato:  bedrest at home with pelvic rest  Bleeding precautions given        Mechelle Massey MD  7/15/2025  13:24 EDT

## 2025-07-15 NOTE — NURSING NOTE
Pt discharged home per MD order. RN instructed pt to return to labor isaacs if any of the following occur: decreased fetal movement, vaginal bleeding, leaking of fluid. Pt verbalized understanding. Pt ambulating off unit in stable condition with all personal belongings.

## 2025-07-15 NOTE — TELEPHONE ENCOUNTER
Patient states she is wearing a panty liner. Was not saturated but states is was going to be saturated if did not change panty liber. There was blood in the toilet. Pain yesterday was 4/10. Today pain is 7/10. She states she took Tylenol around 9:15 this AM. It helped some but now it is back. Patient states she is already at the hospital. She states she is going to [facility]. Notified Dr Negro.

## 2025-07-16 ENCOUNTER — OFFICE VISIT (OUTPATIENT)
Dept: OBSTETRICS AND GYNECOLOGY | Facility: HOSPITAL | Age: 25
End: 2025-07-16
Payer: COMMERCIAL

## 2025-07-16 ENCOUNTER — HOSPITAL ENCOUNTER (OUTPATIENT)
Dept: WOMENS IMAGING | Facility: HOSPITAL | Age: 25
Discharge: HOME | End: 2025-07-16
Payer: COMMERCIAL

## 2025-07-16 VITALS — SYSTOLIC BLOOD PRESSURE: 136 MMHG | BODY MASS INDEX: 30.54 KG/M2 | WEIGHT: 195 LBS | DIASTOLIC BLOOD PRESSURE: 76 MMHG

## 2025-07-16 DIAGNOSIS — O46.90 VAGINAL BLEEDING DURING PREGNANCY: ICD-10-CM

## 2025-07-16 DIAGNOSIS — Z36.2 ENCOUNTER FOR FOLLOW-UP ULTRASOUND OF FETAL ANATOMY: ICD-10-CM

## 2025-07-16 DIAGNOSIS — O43.119 ANTEPARTUM PLACENTA CIRCUMVALLATA: Primary | ICD-10-CM

## 2025-07-16 DIAGNOSIS — Z3A.22 22 WEEKS GESTATION OF PREGNANCY: ICD-10-CM

## 2025-07-16 DIAGNOSIS — O43.119 ANTEPARTUM PLACENTA CIRCUMVALLATA: ICD-10-CM

## 2025-07-16 DIAGNOSIS — O46.90 VAGINAL BLEEDING DURING PREGNANCY, ANTEPARTUM: ICD-10-CM

## 2025-07-16 DIAGNOSIS — O43.199 MARGINAL INSERTION OF UMBILICAL CORD AFFECTING MANAGEMENT OF MOTHER: ICD-10-CM

## 2025-07-16 LAB
A VAGINAE DNA VAG QL NAA+PROBE: NORMAL SCORE
BACTERIA UR CULT: NO GROWTH
BACTERIA UR CULT: NORMAL
BVAB2 DNA VAG QL NAA+PROBE: NORMAL SCORE
C ALBICANS DNA VAG QL NAA+PROBE: NEGATIVE
C GLABRATA DNA VAG QL NAA+PROBE: NEGATIVE
C TRACH DNA SPEC QL NAA+PROBE: NEGATIVE
MEGA1 DNA VAG QL NAA+PROBE: NORMAL SCORE
N GONORRHOEA DNA VAG QL NAA+PROBE: NEGATIVE
T VAGINALIS DNA VAG QL NAA+PROBE: NEGATIVE

## 2025-07-16 PROCEDURE — 76811 OB US DETAILED SNGL FETUS: CPT

## 2025-07-16 NOTE — PROGRESS NOTES
Patient denies bleeding, leaking fluid or contractions  NIPT negative  Patients needs to schedule next follow up with Dr. Negro's office.

## 2025-07-16 NOTE — PROGRESS NOTES
Patient seen in Maternal Fetal Medicine clinic today. Please see full note in under imaging tab of patient chart in Epic (Viewpoint report).    Jennifer Harrell MD

## 2025-07-17 ENCOUNTER — HOSPITAL ENCOUNTER (EMERGENCY)
Facility: HOSPITAL | Age: 25
Discharge: HOME | End: 2025-07-17
Attending: OBSTETRICS & GYNECOLOGY | Admitting: OBSTETRICS & GYNECOLOGY
Payer: COMMERCIAL

## 2025-07-17 VITALS
DIASTOLIC BLOOD PRESSURE: 64 MMHG | HEART RATE: 91 BPM | WEIGHT: 195 LBS | TEMPERATURE: 98.1 F | SYSTOLIC BLOOD PRESSURE: 120 MMHG | BODY MASS INDEX: 31.34 KG/M2 | RESPIRATION RATE: 18 BRPM | HEIGHT: 66 IN

## 2025-07-17 LAB
BACTERIA UR QL AUTO: ABNORMAL /HPF
BILIRUB UR QL STRIP: NEGATIVE
CLARITY UR: CLEAR
COLOR UR: YELLOW
GLUCOSE UR STRIP-MCNC: ABNORMAL MG/DL
HGB UR QL STRIP.AUTO: NEGATIVE
HYALINE CASTS UR QL AUTO: ABNORMAL /LPF
KETONES UR QL STRIP: NEGATIVE
LEUKOCYTE ESTERASE UR QL STRIP.AUTO: ABNORMAL
NITRITE UR QL STRIP: NEGATIVE
PH UR STRIP.AUTO: 6 [PH] (ref 5–8)
PROT UR QL STRIP: NEGATIVE
RBC # UR STRIP: ABNORMAL /HPF
REF LAB TEST METHOD: ABNORMAL
SP GR UR STRIP: <=1.005 (ref 1–1.03)
SQUAMOUS #/AREA URNS HPF: ABNORMAL /HPF
UROBILINOGEN UR QL STRIP: ABNORMAL
WBC # UR STRIP: ABNORMAL /HPF

## 2025-07-17 PROCEDURE — 99283 EMERGENCY DEPT VISIT LOW MDM: CPT | Performed by: OBSTETRICS & GYNECOLOGY

## 2025-07-17 PROCEDURE — 81001 URINALYSIS AUTO W/SCOPE: CPT | Performed by: OBSTETRICS & GYNECOLOGY

## 2025-07-17 PROCEDURE — 59025 FETAL NON-STRESS TEST: CPT

## 2025-07-17 RX ORDER — FLUCONAZOLE 150 MG/1
150 TABLET ORAL ONCE
Qty: 1 TABLET | Refills: 0 | Status: SHIPPED | OUTPATIENT
Start: 2025-07-17 | End: 2025-07-17

## 2025-07-17 NOTE — OBED NOTES
"[facility]  Obstetric History and Physical    Referring Provider: Sushant Negro MD      Chief Complaint   Patient presents with    Vaginal Bleeding     Spotting last night, cramping, HA       Subjective     Patient is a 24 y.o. female  currently at 22w5d, who presents with complaint of continued vaginal spotting.  Patient reports that is by last night and this morning.  Patient denies recent trauma, fever, urinary symptoms, reports normal fetal activity.   course complicated by vaginal bleeding cramping last weekend PDC ultrasound revealed small amount of blood noted membranous from the leading edge of the placenta towards the cervix.  Marginal cord insertion         The following portions of the patients history were reviewed and updated as appropriate: current medications, allergies, past medical history, past surgical history, past family history, past social history, and problem list .       Prenatal Information:   Maternal Prenatal Labs  Blood Type No results found for: \"ABO\"   Rh Status No results found for: \"RH\"   Antibody Screen No results found for: \"ABSCRN\"   Gonnorhea No results found for: \"GCCX\"   Chlamydia No results found for: \"CLAMYDCU\"   RPR No results found for: \"RPR\"   Syphilis Antibody No results found for: \"SYPHILIS\"   Rubella No results found for: \"RUBELLAIGGIN\"   Hepatitis B Surface Antigen No results found for: \"HEPBSAG\"   HIV-1 Antibody No results found for: \"LABHIV1\"   Hepatitis C Antibody No results found for: \"HEPCAB\"   Rapid Urin Drug Screen No results found for: \"AMPMETHU\", \"BARBITSCNUR\", \"LABBENZSCN\", \"LABMETHSCN\", \"LABOPIASCN\", \"THCURSCR\", \"COCAINEUR\", \"AMPHETSCREEN\", \"PROPOXSCN\", \"BUPRENORSCNU\", \"METAMPSCNUR\", \"OXYCODONESCN\", \"TRICYCLICSCN\"   Group B Strep Culture No results found for: \"GBSANTIGEN\"           External Prenatal Results       Pregnancy Outside Results - Transcribed From Office Records - See Scanned Records For Details       Test Value Date Time    ABO  " O  25 1916    Rh  Positive  25    Antibody Screen  Negative  25 1313    Varicella IgG       Rubella  3.68 index 25 1313    Hgb  11.1 g/dL 25       13.1 g/dL 25 1700       13.1 g/dL 25 1313    Hct  32.7 % 25       37.2 % 25 1700       39.6 % 25 1313    HgB A1c        1h GTT       3h GTT Fasting       3h GTT 1 hour       3h GTT 2 hour       3h GTT 3 hour        Gonorrhea (discrete)  Negative  25        Negative  25 1313    Chlamydia (discrete)  Negative  25        Negative  25 1313    RPR  Non Reactive  25 1313    Syphils cascade: TP-Ab (FTA)       TP-Ab       TP-Ab (EIA)       TPPA       HBsAg  Negative  25 1313    Herpes Simplex Virus PCR       Herpes Simplex VIrus Culture       HIV  Non Reactive  25 1313    Hep C RNA Quant PCR       Hep C Antibody  Non Reactive  25 1313    AFP       NIPT       Cystic Fibrosis (Tawny)       Cystic Fibroisis        Spinal Muscular atrophy       Fragile X       Group B Strep       GBS Susceptibility to Clindamycin       GBS Susceptibility to Erythromycin       Fetal Fibronectin       Genetic Testing, Maternal Blood                 Drug Screening       Test Value Date Time    Urine Drug Screen       Amphetamine Screen  Negative ng/mL 25 1313    Barbiturate Screen  Negative ng/mL 25 1313    Benzodiazepine Screen  Negative ng/mL 25 1313    Methadone Screen  Negative ng/mL 25 1313    Phencyclidine Screen  Negative ng/mL 25 1313    Opiates Screen       THC Screen       Cocaine Screen       Propoxyphene Screen  Negative ng/mL 25 1313    Buprenorphine Screen       Methamphetamine Screen       Oxycodone Screen       Tricyclic Antidepressants Screen                 Legend    ^: Historical                              Past OB History:       OB History    Para Term  AB Living   2 0 0 0 1 0   SAB IAB Ectopic Molar Multiple  Live Births   0 0 1 0 0 0      # Outcome Date GA Lbr Reid/2nd Weight Sex Type Anes PTL Lv   2 Current            1 Ectopic 08/2023              Obstetric Comments   8/2023- ectopic pregnancy- LT salpingectomy   FOB #1 Pregnancy 1&2       Past Medical History: Past Medical History:   Diagnosis Date    Anxiety     Depression February 2022    Ectopic pregnancy 08/28/2023    Ovarian cyst     I’ve had them in the past. Last in 2019    Scoliosis     Seizures     Temporal partial     Urinary tract infection     I’ve had them in the past    Varicella     I was in elementary school      Past Surgical History Past Surgical History:   Procedure Laterality Date    SALPINGECTOMY Left 08/2023    ruptured ectopic    WISDOM TOOTH EXTRACTION        Family History: Family History   Problem Relation Age of Onset    Heart attack Father 47    Seizures Mother     Other (lupus) Mother     Lupus Mother     Depression Mother     Mental illness Mother         Bipolar    Depression Sister     Mental illness Sister         Bipolar    COPD Paternal Grandfather     Diabetes Paternal Grandfather     Emphysema Paternal Grandfather     Hyperlipidemia Maternal Grandmother     Diabetes Maternal Grandmother     Breast cancer Neg Hx     Ovarian cancer Neg Hx     Uterine cancer Neg Hx     Colon cancer Neg Hx       Social History:  reports that she has never smoked. She has never used smokeless tobacco.   reports that she does not currently use alcohol.   reports no history of drug use.                   General ROS Negative Findings:Headaches, Visual Changes, Epigastric pain, Anorexia, Nausia/Vomiting, and ROM    ROS     All other systems have been reviewed and are neg  Objective       Vital Signs Range for the last 24 hours  Temperature: Temp:  [98.1 °F (36.7 °C)] 98.1 °F (36.7 °C)   Temp Source: Temp src: Oral   BP: BP: (120)/(64) 120/64   Pulse: Heart Rate:  [91] 91   Respirations: Resp:  [18] 18   SPO2:     O2 Amount (l/min):     O2 Devices      Weight: Weight:  [88.5 kg (195 lb)] 88.5 kg (195 lb)     Physical Examination:   General:   alert, appears stated age, and cooperative   Skin:      HEENT:     Lungs:      Heart:      Gastrointestinal: Soft, gravid uterus, guarding benign exam   Lower Extremities: Edema none , no calf tenderness   : Intertriginous candidiasis noted.  Genitalia slightly swollen edematous, vaginal mucosa erythematous with whitish cottage discharge pH low no blood noted in vaginal vault, cervix closed   Musculoskeletal:     Neuro: Focal deficits noted  none                Fetal Heart Rate Assessment   Method:     Beats/min:  160   Baseline:     Varibility:     Accels:     Decels:     Tracing Category:       Uterine Assessment   Method:     Frequency (min):     Ctx Count in 10 min:     Duration:     Intensity:     Intensity by IUPC:     Resting Tone:     Resting Tone by IUPC:     [place] Units:       Laboratory Results:   Lab Results (last 24 hours)       Procedure Component Value Units Date/Time    Urinalysis With Microscopic If Indicated (No Culture) - Urine, Clean Catch [[id number]]  (Abnormal) Collected: 07/17/25 1111    Specimen: Urine, Clean Catch Updated: 07/17/25 1134     Color, UA Yellow     Appearance, UA Clear     pH, UA 6.0     Specific Gravity, UA <=1.005     Glucose,  mg/dL (Trace)     Ketones, UA Negative     Bilirubin, UA Negative     Blood, UA Negative     Protein, UA Negative     Leuk Esterase, UA Trace     Nitrite, UA Negative     Urobilinogen, UA 1.0 E.U./dL    Urinalysis, Microscopic Only - Urine, Clean Catch [[id number]]  (Abnormal) Collected: 07/17/25 1111    Specimen: Urine, Clean Catch Updated: 07/17/25 1134     RBC, UA 0-2 /HPF      WBC, UA 3-5 /HPF      Bacteria, UA None Seen /HPF      Squamous Epithelial Cells, UA 0-2 /HPF      Hyaline Casts, UA None Seen /LPF      Methodology Automated Microscopy          Radiology Review:   Imaging Results (Last 24 Hours)       ** No results found for the last 24  hours. **          Other Studies:    Assessment & Plan       Marginal insertion of umbilical cord affecting management of mother        PAUL Course / Assessment:  1.   All lab and imaging results listed above have been reviewed by me.  2.   Intrauterine pregnancy at 22w5d gestation with reassuring fetal status.    3.  History of vaginal bleeding currently no active bleeding noted  4.  No evidence of short cervix or labor  5.  Vulvovaginal candidiasis   6. RH +  Plan:  Discharge to home, pelvic rest, no for work till seen by primary OB provider next week,  labor precautions, over-the-counter Monistat and 1 dose of Diflucan 150 mg Rx  2.   Follow-up with primary OB provider in 1 week or as needed  3.   Plan of care has been reviewed with patient.  4.   Risks, benefits of treatment plan have been discussed.  5.   All questions have been answered.  6.           Tod Hannah DO  2025  12:01 EDT

## 2025-07-17 NOTE — DISCHARGE INSTRUCTIONS
Keep all follow up appointments. Start taking a 7 day over the counter Monistat according to box instructions.

## 2025-07-23 ENCOUNTER — TELEPHONE (OUTPATIENT)
Dept: OBSTETRICS AND GYNECOLOGY | Facility: CLINIC | Age: 25
End: 2025-07-23
Payer: COMMERCIAL

## 2025-07-23 NOTE — TELEPHONE ENCOUNTER
PT is calling in because she is not feeling baby as much as she normally would - PT does have an apt with [place] tomorrow  9:50 - she is worried and would like to speak with someone regarding this. PT does have an anterior placenta.

## 2025-07-24 ENCOUNTER — ROUTINE PRENATAL (OUTPATIENT)
Dept: OBSTETRICS AND GYNECOLOGY | Facility: CLINIC | Age: 25
End: 2025-07-24
Payer: COMMERCIAL

## 2025-07-24 VITALS — DIASTOLIC BLOOD PRESSURE: 84 MMHG | BODY MASS INDEX: 31.99 KG/M2 | WEIGHT: 198.2 LBS | SYSTOLIC BLOOD PRESSURE: 126 MMHG

## 2025-07-24 DIAGNOSIS — Z34.90 PRENATAL CARE, ANTEPARTUM, UNSPECIFIED GRAVIDITY: Primary | ICD-10-CM

## 2025-07-24 DIAGNOSIS — O41.8X20 SUBCHORIONIC HEMATOMA IN SECOND TRIMESTER, SINGLE OR UNSPECIFIED FETUS: ICD-10-CM

## 2025-07-24 DIAGNOSIS — O46.8X2 SUBCHORIONIC HEMATOMA IN SECOND TRIMESTER, SINGLE OR UNSPECIFIED FETUS: ICD-10-CM

## 2025-07-24 LAB
GLUCOSE UR STRIP-MCNC: NEGATIVE MG/DL
PROT UR STRIP-MCNC: NEGATIVE MG/DL

## 2025-07-24 RX ORDER — MICONAZOLE NITRATE 2 %
1 CREAM WITH APPLICATOR VAGINAL NIGHTLY
COMMUNITY

## 2025-07-24 NOTE — PROGRESS NOTES
"    OB FOLLOW UP  CC- Here for care of pregnancy        Evelyn Lawrence is a 24 y.o.  23w5d patient being seen today for her obstetrical follow up visit. Patient reports being on bed rest has helped with bleeding and cramping. She states L&D placed her on bedrest until this visit today to see if OP wants to continue bedrest or not.      She seen PDC on  for marginal cord insertion, placenta circumvallate, spotting in pregnancy. Scan resulted: \"There may be a small retro-membranous collection of blood trailing from the leading edge of the placenta towards the cervix. It appears very small. The placental cord insertion is marginal. The previously noted placental shelf/circumvallate area of placenta is now compressed against the lower left side of the uterus.\" She was told to stop taking Baby Asa.     Patient went to L&D on  for complaint of continued vaginal spotting. Patient reports that is happened  PM and  AM. Patient denies recent trauma, fever, urinary symptoms, reports normal fetal activity.  course complicated by vaginal bleeding, cramping-[facility] ultrasound revealed small amount of blood noted membranous from the leading edge of the placenta towards the cervix. Marginal cord insertion. She was diagnosed with Yeast infection. She has been using Monistat 7. She was also placed on bed rest by L&D.   Since then, she reports she bled again with cramping on .     Her prenatal care is complicated by (and status) : see below.  Patient Active Problem List   Diagnosis    Moderate episode of recurrent major depressive disorder    CAROLE (generalized anxiety disorder)    Marginal insertion of umbilical cord affecting management of mother    Antepartum placenta circumvallata    Vaginal bleeding during pregnancy, antepartum         Ultrasound Today: No.   Reviewed 1 hr glucose testing and TDAP next visit.    ROS -   Patient Denies: leaking of fluid, dysuria, excessive vomiting, and " more than 6 contractions per hour  Fetal Movement : normal  Other than what is documented in the HPI, all other systems reviewed and are negative.       The additional following portions of the patient's history were reviewed and updated as appropriate: allergies, current medications, past family history, past medical history, past social history, past surgical history, and problem list.      I have reviewed and agree with the HPI, ROS, and historical information as entered above. Sushant Negro MD      /84   Wt 89.9 kg (198 lb 3.2 oz)   LMP 2025 (Exact Date)   BMI 31.99 kg/m²       EXAM:     Prenatal Vitals  BP: 126/84  Weight: 89.9 kg (198 lb 3.2 oz)   Fetal Heart Rate: 160               Urine Glucose Read-only: Negative  Urine Protein Read-only: Negative       Assessment and Plan    Problem List Items Addressed This Visit    None  Visit Diagnoses         Prenatal care, antepartum, unspecified     -  Primary    Relevant Orders    POC Urinalysis Dipstick (Completed)    Antibody Screen    CBC (No Diff)    Gestational Screen 1 Hr ([facility])    RPR Qualitative with Reflex to Quant      Subchorionic hematoma in second trimester, single or unspecified fetus            Will be off work as of 23 weeks due to bleeding     Pregnancy at 23w5d  Fetal status reassuring.  No US indicated today.  1 hour gtt, CBC, Antibody screen, TDAP, and RPR next visit. Instructions given  Discussed/encouraged TDAP vaccination after 28 weeks  Activity and Exercise discussed.  No follow-ups on file.      Sushant Negro MD  2025

## 2025-08-04 ENCOUNTER — TELEPHONE (OUTPATIENT)
Dept: OBSTETRICS AND GYNECOLOGY | Facility: CLINIC | Age: 25
End: 2025-08-04
Payer: COMMERCIAL

## 2025-08-11 ENCOUNTER — TELEPHONE (OUTPATIENT)
Dept: OBSTETRICS AND GYNECOLOGY | Facility: CLINIC | Age: 25
End: 2025-08-11
Payer: COMMERCIAL

## 2025-08-11 ENCOUNTER — ROUTINE PRENATAL (OUTPATIENT)
Dept: OBSTETRICS AND GYNECOLOGY | Facility: CLINIC | Age: 25
End: 2025-08-11
Payer: COMMERCIAL

## 2025-08-11 VITALS — BODY MASS INDEX: 34.31 KG/M2 | DIASTOLIC BLOOD PRESSURE: 76 MMHG | WEIGHT: 212.6 LBS | SYSTOLIC BLOOD PRESSURE: 118 MMHG

## 2025-08-11 DIAGNOSIS — F41.1 GAD (GENERALIZED ANXIETY DISORDER): ICD-10-CM

## 2025-08-11 DIAGNOSIS — Z3A.26 26 WEEKS GESTATION OF PREGNANCY: Primary | ICD-10-CM

## 2025-08-11 DIAGNOSIS — O16.9 ELEVATED BLOOD PRESSURE AFFECTING PREGNANCY, ANTEPARTUM: ICD-10-CM

## 2025-08-11 DIAGNOSIS — O43.199 MARGINAL INSERTION OF UMBILICAL CORD AFFECTING MANAGEMENT OF MOTHER: ICD-10-CM

## 2025-08-11 DIAGNOSIS — O43.119 ANTEPARTUM PLACENTA CIRCUMVALLATA: ICD-10-CM

## 2025-08-11 DIAGNOSIS — R10.2 PELVIC CRAMPING: ICD-10-CM

## 2025-08-11 DIAGNOSIS — R51.9 NONINTRACTABLE HEADACHE, UNSPECIFIED CHRONICITY PATTERN, UNSPECIFIED HEADACHE TYPE: ICD-10-CM

## 2025-08-11 DIAGNOSIS — F33.1 MODERATE EPISODE OF RECURRENT MAJOR DEPRESSIVE DISORDER: ICD-10-CM

## 2025-08-11 DIAGNOSIS — Z01.30 BLOOD PRESSURE CHECK: ICD-10-CM

## 2025-08-11 LAB
BILIRUB BLD-MCNC: NEGATIVE MG/DL
CLARITY, POC: CLEAR
COLOR UR: YELLOW
GLUCOSE UR STRIP-MCNC: NEGATIVE MG/DL
KETONES UR QL: NEGATIVE
LEUKOCYTE EST, POC: NEGATIVE
NITRITE UR-MCNC: NEGATIVE MG/ML
PH UR: 6 [PH] (ref 5–8)
PROT UR STRIP-MCNC: NEGATIVE MG/DL
RBC # UR STRIP: NEGATIVE /UL
SP GR UR: 1 (ref 1–1.03)
UROBILINOGEN UR QL: NORMAL

## 2025-08-11 PROCEDURE — 81002 URINALYSIS NONAUTO W/O SCOPE: CPT

## 2025-08-11 PROCEDURE — 99213 OFFICE O/P EST LOW 20 MIN: CPT

## 2025-08-12 LAB
ALP SERPL-CCNC: 75 IU/L (ref 44–121)
ALT SERPL-CCNC: 20 IU/L (ref 0–32)
AST SERPL-CCNC: 15 IU/L (ref 0–40)
BASOPHILS # BLD AUTO: 0.1 X10E3/UL (ref 0–0.2)
BASOPHILS NFR BLD AUTO: 1 %
BILIRUB SERPL-MCNC: 0.3 MG/DL (ref 0–1.2)
CREAT SERPL-MCNC: 0.54 MG/DL (ref 0.57–1)
EGFRCR SERPLBLD CKD-EPI 2021: 131 ML/MIN/1.73
EOSINOPHIL # BLD AUTO: 0.1 X10E3/UL (ref 0–0.4)
EOSINOPHIL NFR BLD AUTO: 1 %
ERYTHROCYTE [DISTWIDTH] IN BLOOD BY AUTOMATED COUNT: 11.5 % (ref 11.7–15.4)
HCT VFR BLD AUTO: 36.4 % (ref 34–46.6)
HGB BLD-MCNC: 12.2 G/DL (ref 11.1–15.9)
IMM GRANULOCYTES # BLD AUTO: 0.1 X10E3/UL (ref 0–0.1)
IMM GRANULOCYTES NFR BLD AUTO: 1 %
LDH SERPL L TO P-CCNC: 150 IU/L (ref 119–226)
LYMPHOCYTES # BLD AUTO: 1.4 X10E3/UL (ref 0.7–3.1)
LYMPHOCYTES NFR BLD AUTO: 15 %
MCH RBC QN AUTO: 33.8 PG (ref 26.6–33)
MCHC RBC AUTO-ENTMCNC: 33.5 G/DL (ref 31.5–35.7)
MCV RBC AUTO: 101 FL (ref 79–97)
MONOCYTES # BLD AUTO: 0.9 X10E3/UL (ref 0.1–0.9)
MONOCYTES NFR BLD AUTO: 10 %
NEUTROPHILS # BLD AUTO: 6.5 X10E3/UL (ref 1.4–7)
NEUTROPHILS NFR BLD AUTO: 72 %
PLATELET # BLD AUTO: 213 X10E3/UL (ref 150–450)
RBC # BLD AUTO: 3.61 X10E6/UL (ref 3.77–5.28)
URATE SERPL-MCNC: 2.9 MG/DL (ref 2.6–6.2)
WBC # BLD AUTO: 9.1 X10E3/UL (ref 3.4–10.8)

## 2025-08-21 ENCOUNTER — ROUTINE PRENATAL (OUTPATIENT)
Dept: OBSTETRICS AND GYNECOLOGY | Facility: CLINIC | Age: 25
End: 2025-08-21
Payer: COMMERCIAL

## 2025-08-21 VITALS — BODY MASS INDEX: 34.44 KG/M2 | WEIGHT: 213.4 LBS | SYSTOLIC BLOOD PRESSURE: 118 MMHG | DIASTOLIC BLOOD PRESSURE: 68 MMHG

## 2025-08-21 DIAGNOSIS — Z34.93 THIRD TRIMESTER PREGNANCY: Primary | ICD-10-CM

## 2025-08-21 DIAGNOSIS — Z3A.27 27 WEEKS GESTATION OF PREGNANCY: ICD-10-CM

## 2025-08-21 LAB
GLUCOSE 1H P 50 G GLC PO SERPL-MCNC: 143 MG/DL (ref 65–139)
GLUCOSE UR STRIP-MCNC: NEGATIVE MG/DL
PROT UR STRIP-MCNC: NEGATIVE MG/DL

## 2025-08-22 LAB
BLD GP AB SCN SERPL QL: NEGATIVE
ERYTHROCYTE [DISTWIDTH] IN BLOOD BY AUTOMATED COUNT: 11.7 % (ref 12.3–15.4)
HCT VFR BLD AUTO: 35.1 % (ref 34–46.6)
HGB BLD-MCNC: 11.4 G/DL (ref 12–15.9)
MCH RBC QN AUTO: 32.8 PG (ref 26.6–33)
MCHC RBC AUTO-ENTMCNC: 32.5 G/DL (ref 31.5–35.7)
MCV RBC AUTO: 100.9 FL (ref 79–97)
PLATELET # BLD AUTO: 186 10*3/MM3 (ref 140–450)
RBC # BLD AUTO: 3.48 10*6/MM3 (ref 3.77–5.28)
RPR SER QL: NON REACTIVE
WBC # BLD AUTO: 8.16 10*3/MM3 (ref 3.4–10.8)

## 2025-08-28 ENCOUNTER — LAB (OUTPATIENT)
Dept: OBSTETRICS AND GYNECOLOGY | Facility: CLINIC | Age: 25
End: 2025-08-28
Payer: COMMERCIAL

## 2025-08-28 DIAGNOSIS — Z34.93 THIRD TRIMESTER PREGNANCY: Primary | ICD-10-CM

## 2025-08-29 LAB
GLUCOSE 1H P 100 G GLC PO SERPL-MCNC: 126 MG/DL (ref 65–179)
GLUCOSE 2H P 100 G GLC PO SERPL-MCNC: 137 MG/DL (ref 65–154)
GLUCOSE 3H P 100 G GLC PO SERPL-MCNC: 138 MG/DL (ref 65–139)
GLUCOSE P FAST SERPL-MCNC: 81 MG/DL (ref 65–94)